# Patient Record
Sex: FEMALE | Race: BLACK OR AFRICAN AMERICAN | NOT HISPANIC OR LATINO | Employment: FULL TIME | URBAN - METROPOLITAN AREA
[De-identification: names, ages, dates, MRNs, and addresses within clinical notes are randomized per-mention and may not be internally consistent; named-entity substitution may affect disease eponyms.]

---

## 2020-03-17 ENCOUNTER — OFFICE VISIT (OUTPATIENT)
Dept: URGENT CARE | Facility: CLINIC | Age: 50
End: 2020-03-17
Payer: COMMERCIAL

## 2020-03-17 VITALS
TEMPERATURE: 98 F | DIASTOLIC BLOOD PRESSURE: 90 MMHG | HEART RATE: 97 BPM | SYSTOLIC BLOOD PRESSURE: 154 MMHG | WEIGHT: 198 LBS | BODY MASS INDEX: 33.8 KG/M2 | OXYGEN SATURATION: 99 % | HEIGHT: 64 IN

## 2020-03-17 DIAGNOSIS — J06.9 VIRAL URI WITH COUGH: Primary | ICD-10-CM

## 2020-03-17 PROCEDURE — 99203 OFFICE O/P NEW LOW 30 MIN: CPT | Mod: S$GLB,,, | Performed by: PHYSICIAN ASSISTANT

## 2020-03-17 PROCEDURE — 99203 PR OFFICE/OUTPT VISIT, NEW, LEVL III, 30-44 MIN: ICD-10-PCS | Mod: S$GLB,,, | Performed by: PHYSICIAN ASSISTANT

## 2020-03-17 RX ORDER — OSELTAMIVIR PHOSPHATE 75 MG/1
CAPSULE ORAL
COMMUNITY
Start: 2020-03-13 | End: 2022-06-06

## 2020-03-17 RX ORDER — PROMETHAZINE HYDROCHLORIDE AND DEXTROMETHORPHAN HYDROBROMIDE 6.25; 15 MG/5ML; MG/5ML
5 SYRUP ORAL NIGHTLY PRN
Qty: 118 ML | Refills: 0 | Status: SHIPPED | OUTPATIENT
Start: 2020-03-17 | End: 2020-03-27

## 2020-03-17 RX ORDER — ONDANSETRON 4 MG/1
4 TABLET, ORALLY DISINTEGRATING ORAL EVERY 8 HOURS PRN
Qty: 6 TABLET | Refills: 0 | Status: SHIPPED | OUTPATIENT
Start: 2020-03-17 | End: 2023-02-09

## 2020-03-17 RX ORDER — BENZONATATE 200 MG/1
200 CAPSULE ORAL 3 TIMES DAILY PRN
Qty: 30 CAPSULE | Refills: 0 | Status: SHIPPED | OUTPATIENT
Start: 2020-03-17 | End: 2020-03-27

## 2020-03-17 NOTE — PATIENT INSTRUCTIONS
At this time you do not meet requirement for testing for COVID 19.  You are considered low risk.  Monitor symptoms closely, schedule tele health visit he develops new or worsening symptoms or have increased concern.     - I recommend that you self quarantine until you are fever free and symptoms resolve.    - Rest.    - Drink plenty of fluids.      - Viral upper respiratory infections typically run their course in 10-14 days.     - Tylenol or Ibuprofen as directed as needed for fever/pain. Avoid tylenol if you have a history of liver disease. Do not take ibuprofen if you have a history of GI bleeding, kidney disease, or if you take blood thinners.     - You can take over-the-counter claritin, zyrtec, allegra, or xyzal as directed. These are antihistamines that can help with runny nose, nasal congestion, sneezing, and helps to dry up post-nasal drip, which usually causes sore throat and cough.   - If you do NOT have high blood pressure, you may use a decongestant form (D)  of this medication or if you do not take the D form, you can take sudafed  (pseudoephedrine) over the counter, which is a decongestant.    - You can use Flonase (fluticasone) nasal spray as directed for sinus congestion and postnasal drip. This is a steroid nasal spray that works locally over time to decrease the inflammation in your nose/sinuses and help with allergic symptoms. This is not an quick- relief spray like afrin, but it works well if used daily.  Discontinue if you develop nose bleed  - use nasal saline prior to Flonase.    - Use Ocean Spray Nasal Saline 1-3 puffs each nostril every 2-3 hours then blow out onto tissue. This is to irrigate the nasal passage way to clear the sinus openings. Use until sinus problem resolved.      - you can take plain Mucinex (guaifenesin) 1200 mg twice a day to help loosen mucous    -warm salt water gargles can help with sore throat    - warm tea with honey can help with cough. Honey is a natural cough  "suppressant.    - Take the tessalon (benzonatate) as needed as prescribed for cough   - Only take the promethazine- DM as directed, as needed at night for cough. This medication may cause drowsiness.     - Follow up with your PCP or specialty clinic as directed in the next 1-2 weeks if not improved or as needed.  You can call [phone] to schedule an appointment with the appropriate provider.      - Go to the ER if you develop new or worsening symptoms.     - You must understand that you have received an Urgent Care treatment only and that you may be released before all of your medical problems are known or treated.   - You, the patient, will arrange for follow up care as instructed.   - If your condition worsens or fails to improve we recommend that you receive another evaluation at the ER immediately or contact your PCP to discuss your concerns or return here.         Viral Syndrome (Adult)  A viral illness may cause a number of symptoms. The symptoms depend on the part of the body that the virus affects. If it settles in your nose, throat, and lungs, it may cause cough, sore throat, congestion, and sometimes headache. If it settles in your stomach and intestinal tract, it may cause vomiting and diarrhea. Sometimes it causes vague symptoms like "aching all over," feeling tired, loss of appetite, or fever.  A viral illness usually lasts 1 to 2 weeks, but sometimes it lasts longer. In some cases, a more serious infection can look like a viral syndrome in the first few days of the illness. You may need another exam and additional tests to know the difference. Watch for the warning signs listed below.  Home care  Follow these guidelines for taking care of yourself at home:  · If symptoms are severe, rest at home for the first 2 to 3 days.  · Stay away from cigarette smoke - both your smoke and the smoke from others.  · You may use over-the-counter acetaminophen or ibuprofen for fever, muscle aching, and headache, " unless another medicine was prescribed for this. If you have chronic liver or kidney disease or ever had a stomach ulcer or GI bleeding, talk with your doctor before using these medicines. No one who is younger than 18 and ill with a fever should take aspirin. It may cause severe disease or death.  · Your appetite may be poor, so a light diet is fine. Avoid dehydration by drinking 8 to 12 8-ounce glasses of fluids each day. This may include water; orange juice; lemonade; apple, grape, and cranberry juice; clear fruit drinks; electrolyte replacement and sports drinks; and decaffeinated teas and coffee. If you have been diagnosed with a kidney disease, ask your doctor how much and what types of fluids you should drink to prevent dehydration. If you have kidney disease, drinking too much fluid can cause it build up in the your body and be dangerous to your health.  · Over-the-counter remedies won't shorten the length of the illness but may be helpful for cough, sore throat; and nasal and sinus congestion. Don't use decongestants if you have high blood pressure.  Follow-up care  Follow up with your healthcare provider if you do not improve over the next week.  Call 911  Get emergency medical care if any of the following occur:  · Convulsion  · Feeling weak, dizzy, or like you are going to faint  · Chest pain, shortness of breath, wheezing, or difficulty breathing  When to seek medical advice  Call your healthcare provider right away if any of these occur:  · Cough with lots of colored sputum (mucus) or blood in your sputum  · Chest pain, shortness of breath, wheezing, or difficulty breathing  · Severe headache; face, neck, or ear pain  · Severe, constant pain in the lower right side of your belly (abdominal)  · Continued vomiting (cant keep liquids down)  · Frequent diarrhea (more than 5 times a day); blood (red or black color) or mucus in diarrhea  · Feeling weak, dizzy, or like you are going to faint  · Extreme  thirst  · Fever of 100.4°F (38°C) or higher, or as directed by your healthcare provider  Date Last Reviewed: 9/25/2015  © 0497-7570 [employer]. [address] [place] [address], [place] [address]. All rights reserved. This information is not intended as a substitute for professional medical care. Always follow your healthcare professional's instructions.

## 2020-03-17 NOTE — LETTER
March 17, 2020      [facility] [address] [address]  Phone: [phone]  Fax: [phone]       Patient: Mau Tovar   YOB: 1970  Date of Visit: 03/17/2020    To Whom It May Concern:    Maricruz Tovar  was at [facility] on 03/17/2020. She may return to work/school on  03/23/2020 (or sooner if symptoms improve and she goes 24 hours without fever without fever reducing medication)  with no restrictions. If you have any questions or concerns, or if I can be of further assistance, please do not hesitate to contact me.    Sincerely,    Anibal Machuca PA-C

## 2020-03-17 NOTE — PROGRESS NOTES
"Subjective:       Patient ID: Mau Tovar is a 49 y.o. female.    Vitals:  height is 5' 4" (1.626 m) and weight is 89.8 kg (198 lb). Her temperature is 97.8 °F (36.6 °C). Her blood pressure is 154/90 (abnormal) and her pulse is 97. Her oxygen saturation is 99%.     Chief Complaint: Cough    Fever, congestion, sore throat, and cough for 5 days. T max 103F. Pt seen by pcp 3/13/2020 was not tested for flu because there were no tests, but pcp suspected influenza, so she was given tamiflu.  Pt works at [employer]. No known exposure to covid. No shortness of breath. No medical conditions, not immunocompromised. No recent travel.    Cough   This is a new problem. The current episode started in the past 7 days. The problem has been waxing and waning. The problem occurs constantly. The cough is non-productive. Associated symptoms include a fever, myalgias, postnasal drip, a sore throat and sweats. Pertinent negatives include no chest pain, chills, ear pain, eye redness, headaches, hemoptysis, rash, shortness of breath or wheezing. Nothing aggravates the symptoms. Treatments tried: tamifly. The treatment provided no relief. There is no history of asthma, bronchitis, COPD or pneumonia.       Constitution: Positive for fever. Negative for chills, sweating, fatigue and unexpected weight change.   HENT: Positive for congestion, postnasal drip and sore throat. Negative for ear pain, sinus pain, sinus pressure and voice change.    Neck: Negative for neck pain, neck stiffness and painful lymph nodes.   Cardiovascular: Negative for chest pain, palpitations and sob on exertion.   Eyes: Negative for eye redness.   Respiratory: Positive for cough. Negative for chest tightness, sputum production, bloody sputum, COPD, shortness of breath, stridor, wheezing and asthma.    Gastrointestinal: Negative for nausea and vomiting.   Musculoskeletal: Positive for muscle ache. Negative for trauma.   Skin: Negative for rash.   Allergic/Immunologic: " Negative for seasonal allergies and asthma.   Neurological: Negative for dizziness, headaches and disorientation.   Hematologic/Lymphatic: Negative for swollen lymph nodes.   Psychiatric/Behavioral: Negative for disorientation.       Objective:      Physical Exam   Constitutional: She is oriented to person, place, and time. She appears well-developed and well-nourished. She is cooperative.  Non-toxic appearance. She does not have a sickly appearance. She does not appear ill. No distress.   - Due to state of emergency this exam was done [facility].   HENT:   Head: Normocephalic and atraumatic.   Right Ear: Hearing normal.   Left Ear: Hearing normal.   Nose: Nose normal. No mucosal edema, rhinorrhea or nasal deformity. No epistaxis. Right sinus exhibits no maxillary sinus tenderness and no frontal sinus tenderness. Left sinus exhibits no maxillary sinus tenderness and no frontal sinus tenderness.   Mouth/Throat: Uvula is midline, oropharynx is clear and moist and mucous membranes are normal. No trismus in the jaw. Normal dentition. No uvula swelling. No oropharyngeal exudate, posterior oropharyngeal edema or posterior oropharyngeal erythema. Tonsils are 1+ on the right. Tonsils are 1+ on the left. No tonsillar exudate.   Eyes: Conjunctivae and lids are normal. No scleral icterus.   Neck: Trachea normal, full passive range of motion without pain and phonation normal. Neck supple. No neck rigidity. No edema and no erythema present.   Cardiovascular: Normal rate, regular rhythm, normal heart sounds, intact distal pulses and normal pulses.   Pulmonary/Chest: Effort normal and breath sounds normal. No accessory muscle usage or stridor. No tachypnea and no bradypnea. No respiratory distress. She has no decreased breath sounds. She has no wheezes. She has no rhonchi. She has no rales.   No respiratory distress, no frequent cough. Lungs ctab.   Abdominal: Normal appearance.   Musculoskeletal: Normal range of  motion. She exhibits no edema or deformity.   Neurological: She is alert and oriented to person, place, and time. She exhibits normal muscle tone. Coordination normal.   Skin: Skin is warm, dry, intact, not diaphoretic and not pale.   Psychiatric: She has a normal mood and affect. Her speech is normal and behavior is normal. Judgment and thought content normal. Cognition and memory are normal.   Nursing note and vitals reviewed.        Instructed self quarantine. Patient does not meed criteria for covid testing at this time. Suspect viral syndrome, lungs clear and vitals reassuring. Instructed ER if she develops new or worsening symptoms     Assessment:       1. Viral URI with cough        Plan:         Viral URI with cough  -     benzonatate (TESSALON) 200 MG capsule; Take 1 capsule (200 mg total) by mouth 3 (three) times daily as needed for Cough.  Dispense: 30 capsule; Refill: 0  -     promethazine-dextromethorphan (PROMETHAZINE-DM) 6.25-15 mg/5 mL Syrp; Take 5 mLs by mouth nightly as needed.  Dispense: 118 mL; Refill: 0  -     ondansetron (ZOFRAN-ODT) 4 MG TbDL; Take 1 tablet (4 mg total) by mouth every 8 (eight) hours as needed (nausea).  Dispense: 6 tablet; Refill: 0      Patient Instructions   At this time you do not meet requirement for testing for COVID 19.  You are considered low risk.  Monitor symptoms closely, schedule tele health visit he develops new or worsening symptoms or have increased concern.     - I recommend that you self quarantine until you are fever free and symptoms resolve.    - Rest.    - Drink plenty of fluids.      - Viral upper respiratory infections typically run their course in 10-14 days.     - Tylenol or Ibuprofen as directed as needed for fever/pain. Avoid tylenol if you have a history of liver disease. Do not take ibuprofen if you have a history of GI bleeding, kidney disease, or if you take blood thinners.     - You can take over-the-counter claritin, zyrtec, allegra, or xyzal  as directed. These are antihistamines that can help with runny nose, nasal congestion, sneezing, and helps to dry up post-nasal drip, which usually causes sore throat and cough.   - If you do NOT have high blood pressure, you may use a decongestant form (D)  of this medication or if you do not take the D form, you can take sudafed  (pseudoephedrine) over the counter, which is a decongestant.    - You can use Flonase (fluticasone) nasal spray as directed for sinus congestion and postnasal drip. This is a steroid nasal spray that works locally over time to decrease the inflammation in your nose/sinuses and help with allergic symptoms. This is not an quick- relief spray like afrin, but it works well if used daily.  Discontinue if you develop nose bleed  - use nasal saline prior to Flonase.    - Use Ocean Spray Nasal Saline 1-3 puffs each nostril every 2-3 hours then blow out onto tissue. This is to irrigate the nasal passage way to clear the sinus openings. Use until sinus problem resolved.      - you can take plain Mucinex (guaifenesin) 1200 mg twice a day to help loosen mucous    -warm salt water gargles can help with sore throat    - warm tea with honey can help with cough. Honey is a natural cough suppressant.    - Take the tessalon (benzonatate) as needed as prescribed for cough   - Only take the promethazine- DM as directed, as needed at night for cough. This medication may cause drowsiness.     - Follow up with your PCP or specialty clinic as directed in the next 1-2 weeks if not improved or as needed.  You can call [phone] to schedule an appointment with the appropriate provider.      - Go to the ER if you develop new or worsening symptoms.     - You must understand that you have received an Urgent Care treatment only and that you may be released before all of your medical problems are known or treated.   - You, the patient, will arrange for follow up care as instructed.   - If your condition worsens or  "fails to improve we recommend that you receive another evaluation at the ER immediately or contact your PCP to discuss your concerns or return here.         Viral Syndrome (Adult)  A viral illness may cause a number of symptoms. The symptoms depend on the part of the body that the virus affects. If it settles in your nose, throat, and lungs, it may cause cough, sore throat, congestion, and sometimes headache. If it settles in your stomach and intestinal tract, it may cause vomiting and diarrhea. Sometimes it causes vague symptoms like "aching all over," feeling tired, loss of appetite, or fever.  A viral illness usually lasts 1 to 2 weeks, but sometimes it lasts longer. In some cases, a more serious infection can look like a viral syndrome in the first few days of the illness. You may need another exam and additional tests to know the difference. Watch for the warning signs listed below.  Home care  Follow these guidelines for taking care of yourself at home:  · If symptoms are severe, rest at home for the first 2 to 3 days.  · Stay away from cigarette smoke - both your smoke and the smoke from others.  · You may use over-the-counter acetaminophen or ibuprofen for fever, muscle aching, and headache, unless another medicine was prescribed for this. If you have chronic liver or kidney disease or ever had a stomach ulcer or GI bleeding, talk with your doctor before using these medicines. No one who is younger than 18 and ill with a fever should take aspirin. It may cause severe disease or death.  · Your appetite may be poor, so a light diet is fine. Avoid dehydration by drinking 8 to 12 8-ounce glasses of fluids each day. This may include water; orange juice; lemonade; apple, grape, and cranberry juice; clear fruit drinks; electrolyte replacement and sports drinks; and decaffeinated teas and coffee. If you have been diagnosed with a kidney disease, ask your doctor how much and what types of fluids you should drink to " prevent dehydration. If you have kidney disease, drinking too much fluid can cause it build up in the your body and be dangerous to your health.  · Over-the-counter remedies won't shorten the length of the illness but may be helpful for cough, sore throat; and nasal and sinus congestion. Don't use decongestants if you have high blood pressure.  Follow-up care  Follow up with your healthcare provider if you do not improve over the next week.  Call 911  Get emergency medical care if any of the following occur:  · Convulsion  · Feeling weak, dizzy, or like you are going to faint  · Chest pain, shortness of breath, wheezing, or difficulty breathing  When to seek medical advice  Call your healthcare provider right away if any of these occur:  · Cough with lots of colored sputum (mucus) or blood in your sputum  · Chest pain, shortness of breath, wheezing, or difficulty breathing  · Severe headache; face, neck, or ear pain  · Severe, constant pain in the lower right side of your belly (abdominal)  · Continued vomiting (cant keep liquids down)  · Frequent diarrhea (more than 5 times a day); blood (red or black color) or mucus in diarrhea  · Feeling weak, dizzy, or like you are going to faint  · Extreme thirst  · Fever of 100.4°F (38°C) or higher, or as directed by your healthcare provider  Date Last Reviewed: 9/25/2015  © 6496-4175 [employer]. [address] [place] [address]. All rights reserved. This information is not intended as a substitute for professional medical care. Always follow your healthcare professional's instructions.

## 2022-02-03 ENCOUNTER — OFFICE VISIT (OUTPATIENT)
Dept: URGENT CARE | Facility: CLINIC | Age: 52
End: 2022-02-03
Payer: COMMERCIAL

## 2022-02-03 VITALS
OXYGEN SATURATION: 99 % | TEMPERATURE: 98 F | WEIGHT: 198 LBS | HEART RATE: 85 BPM | DIASTOLIC BLOOD PRESSURE: 86 MMHG | BODY MASS INDEX: 33.8 KG/M2 | SYSTOLIC BLOOD PRESSURE: 170 MMHG | RESPIRATION RATE: 18 BRPM | HEIGHT: 64 IN

## 2022-02-03 DIAGNOSIS — M54.2 NECK PAIN: Primary | ICD-10-CM

## 2022-02-03 DIAGNOSIS — G44.219 EPISODIC TENSION-TYPE HEADACHE, NOT INTRACTABLE: ICD-10-CM

## 2022-02-03 DIAGNOSIS — R03.0 ELEVATED BLOOD PRESSURE READING: ICD-10-CM

## 2022-02-03 PROCEDURE — 1159F PR MEDICATION LIST DOCUMENTED IN MEDICAL RECORD: ICD-10-PCS | Mod: CPTII,S$GLB,, | Performed by: NURSE PRACTITIONER

## 2022-02-03 PROCEDURE — 3077F SYST BP >= 140 MM HG: CPT | Mod: CPTII,S$GLB,, | Performed by: NURSE PRACTITIONER

## 2022-02-03 PROCEDURE — 3008F PR BODY MASS INDEX (BMI) DOCUMENTED: ICD-10-PCS | Mod: CPTII,S$GLB,, | Performed by: NURSE PRACTITIONER

## 2022-02-03 PROCEDURE — 3077F PR MOST RECENT SYSTOLIC BLOOD PRESSURE >= 140 MM HG: ICD-10-PCS | Mod: CPTII,S$GLB,, | Performed by: NURSE PRACTITIONER

## 2022-02-03 PROCEDURE — 96372 THER/PROPH/DIAG INJ SC/IM: CPT | Mod: S$GLB,,, | Performed by: NURSE PRACTITIONER

## 2022-02-03 PROCEDURE — 1159F MED LIST DOCD IN RCRD: CPT | Mod: CPTII,S$GLB,, | Performed by: NURSE PRACTITIONER

## 2022-02-03 PROCEDURE — 96372 PR INJECTION,THERAP/PROPH/DIAG2ST, IM OR SUBCUT: ICD-10-PCS | Mod: S$GLB,,, | Performed by: NURSE PRACTITIONER

## 2022-02-03 PROCEDURE — 99213 PR OFFICE/OUTPT VISIT, EST, LEVL III, 20-29 MIN: ICD-10-PCS | Mod: 25,S$GLB,, | Performed by: NURSE PRACTITIONER

## 2022-02-03 PROCEDURE — 1160F RVW MEDS BY RX/DR IN RCRD: CPT | Mod: CPTII,S$GLB,, | Performed by: NURSE PRACTITIONER

## 2022-02-03 PROCEDURE — 3079F PR MOST RECENT DIASTOLIC BLOOD PRESSURE 80-89 MM HG: ICD-10-PCS | Mod: CPTII,S$GLB,, | Performed by: NURSE PRACTITIONER

## 2022-02-03 PROCEDURE — 1160F PR REVIEW ALL MEDS BY PRESCRIBER/CLIN PHARMACIST DOCUMENTED: ICD-10-PCS | Mod: CPTII,S$GLB,, | Performed by: NURSE PRACTITIONER

## 2022-02-03 PROCEDURE — 3008F BODY MASS INDEX DOCD: CPT | Mod: CPTII,S$GLB,, | Performed by: NURSE PRACTITIONER

## 2022-02-03 PROCEDURE — 99213 OFFICE O/P EST LOW 20 MIN: CPT | Mod: 25,S$GLB,, | Performed by: NURSE PRACTITIONER

## 2022-02-03 PROCEDURE — 3079F DIAST BP 80-89 MM HG: CPT | Mod: CPTII,S$GLB,, | Performed by: NURSE PRACTITIONER

## 2022-02-03 RX ORDER — KETOROLAC TROMETHAMINE 30 MG/ML
30 INJECTION, SOLUTION INTRAMUSCULAR; INTRAVENOUS
Status: COMPLETED | OUTPATIENT
Start: 2022-02-03 | End: 2022-02-03

## 2022-02-03 RX ORDER — NAPROXEN 500 MG/1
500 TABLET ORAL 2 TIMES DAILY WITH MEALS
Qty: 30 TABLET | Refills: 0 | OUTPATIENT
Start: 2022-02-03 | End: 2022-02-05

## 2022-02-03 RX ORDER — METHOCARBAMOL 500 MG/1
500 TABLET, FILM COATED ORAL 3 TIMES DAILY
Qty: 30 TABLET | Refills: 0 | Status: SHIPPED | OUTPATIENT
Start: 2022-02-03 | End: 2022-02-05 | Stop reason: SDUPTHER

## 2022-02-03 RX ADMIN — KETOROLAC TROMETHAMINE 30 MG: 30 INJECTION, SOLUTION INTRAMUSCULAR; INTRAVENOUS at 01:02

## 2022-02-03 NOTE — PATIENT INSTRUCTIONS
Elevated Blood Pressure in Office Setting    - Your blood pressure was elevated during your visit to the urgent care. It was not so high that immediate care was needed but it is recommended that you monitor your blood pressure over the next week or two to make sure that it is not staying elevated.    - Please have your blood pressure taken 2-3 times daily at different times of the day.  - Write all of those blood pressures down and record the time that they were taken.   - Keep all that information and take it with you to see your Primary Care Physician.   - According to ACEP guidelines, workup and treatment of hypertension in asymptomatic patient is not warranted:  (1) Initiating treatment for asymptomatic hypertension in the ED is not necessary when patients have follow-up.  (2) Rapidly lowering blood pressure in asymptomatic patients in the ED is unnecessary and may be harmful in some patients  (3) When ED treatment for asymptomatic hypertension is initiated, blood pressure management should attempt to gradually lower blood pressure and should not be expected to be normalized during the initial ED visit.       Patient Education       Generalized Neck Pain Discharge Instructions   About this topic   Our neck has many parts including bones, muscles, tendons, ligaments, and nerves. Vertebrae, the bones in your spine, start at the base of your skull and extend down the back of your neck. There are discs between the vertebrae to cushion the bones. Ligaments, muscles, and tendons help hold your spine in place and let you move your neck. Your spinal cord starts at the base of your brain and extends down your back. It is protected by your vertebrae. Smaller nerves travel from your spinal cord to your muscles and skin.  Most neck pain is caused by an injury to a ligament, tendon, muscle, or nerve.       What care is needed at home?   · Ask your doctor what you need to do when you go home. Make sure you ask questions if  you do not understand what the doctor says.  · Wear your neck brace or cushion as you were told to. If the doctor told you to, you may start doing gentle neck stretches in a few days.  · For recent strains, place an ice pack or a bag of frozen vegetables wrapped in a towel over the painful part. Never put ice right on the skin. Use ice every 1 to 2 hours for 10 to 15 minutes at a time. Use for the first 24 to 48 hours after your injury.  · Use heat after the first 24 to 48 hours, but not right away. Put a heating pad on the painful part for no more than 20 minutes at a time. Never go to sleep with a heating pad on as this can cause burns. You can also take a hot shower or bath.  · You may want to take medicines like ibuprofen or naproxen for swelling and pain. These are nonsteroidal anti-inflammatory drugs (NSAIDs).  · Try to practice good posture to avoid putting strain on your neck. Sit up straight and keep your shoulders back. It can also help to avoid sitting in the same position for too long and to avoid putting pressure on your upper back by carrying heavy things. When you sleep, try to keep your neck in line with the rest of your body.  What follow-up care is needed?   Your doctor may ask you to make visits to the office to check on your progress. Be sure to keep these visits.  What drugs may be needed?   The doctor may order drugs to:  · Help with pain and swelling  · Relax muscles  · Fight an infection  The doctor may give you a shot of an anti-inflammatory drug called a corticosteroid. This will help with swelling. Talk with your doctor about the risks of this shot.  Will physical activity be limited?   You may need to rest for a while. You should not do physical activity that makes your health problem worse. Talk to your doctor if you run, work out, or play sports. You may not be able to do those things until your health problem gets better.  What problems could happen?   · Infection  · Bleeding  · Injury  to nerves, tendons, or blood vessels  · Ongoing pain  · Blood clots  · Numbness, tingling, or weakness in the arms or legs  · Arthritis  · Loss of bladder or bowel control  · Paralysis  What can be done to prevent this health problem?   · Always wear a seat belt. Drive safely. Obey speed limits. Do not drink and drive.  · Have headrests in the car at the right height. The middle of the headrest should be even with the upper parts of your ears.  · Take breaks often when doing things that use repeat movements.  · If you have a desk job, make sure your computer is at eye level and that you have a supportive chair. Read papers at eye level.  · If you use the telephone often for your job, use a headset if possible. Do not hold the phone between your ear and shoulder.  · Stay active and work out to keep your muscles strong and flexible.  · Warm up slowly and stretch before you work out. Use good ways to train, such as slowly adding to how far you run. Do not work out if you are overly tired. Take extra care if working out in cold weather.  · Wear the right equipment when playing sports.  · Always wear helmets for bikes and motorcycles.  When do I need to call the doctor?   · Your neck becomes stiff and hard to move and you are feeling sick or develop a fever or chills.  · You have new weakness in one of your arms.  · You have bad pain that is not helped by pain medicine.  · Your hand or arm is swollen.  · Your arm is numb or tingly.  Teach Back: Helping You Understand   The Teach Back Method helps you understand the information we are giving you. After you talk with the staff, tell them in your own words what you learned. This helps to make sure the staff has described each thing clearly. It also helps to explain things that may have been confusing. Before going home, make sure you can do these:  · I can tell you about my pain.  · I can tell you what may help ease my pain.  · I can tell you what I will do if I have fever,  chills, nausea, vomiting, light sensitivity, or a very bad headache and neck stiffness.  Where can I learn more?   [employer] [facility]  https://www.betterhealth.[place].gov.[place]/health/ConditionsAndTreatments/neck-pain   [facility] Choices  https://www.nhs.[place]/conditions/neck-pain-and-stiff-neck/   Last Reviewed Date   2021-09-01  Consumer Information Use and Disclaimer   This information is not specific medical advice and does not replace information you receive from your health care provider. This is only a brief summary of general information. It does NOT include all information about conditions, illnesses, injuries, tests, procedures, treatments, therapies, discharge instructions or life-style choices that may apply to you. You must talk with your health care provider for complete information about your health and treatment options. This information should not be used to decide whether or not to accept your health care providers advice, instructions or recommendations. Only your health care provider has the knowledge and training to provide advice that is right for you.  Copyright   Copyright © 2021 [employer] and its affiliates and/or licensors. All rights reserved.

## 2022-02-03 NOTE — PROGRESS NOTES
"Subjective:       Patient ID: Mau Tovar is a 51 y.o. female.    Vitals:  height is 5' 4" (1.626 m) and weight is 89.8 kg (198 lb). Her temperature is 98.2 °F (36.8 °C). Her blood pressure is 170/86 (abnormal) and her pulse is 85. Her respiration is 18 and oxygen saturation is 99%.     Chief Complaint: Neck Pain    Neck pain started 2 days ago, upon wakening, and gradually worsening. Pain 10/10 at this time. Denies injury. Appetite ok. No N/V/D. Also having tension pain to occipital area, and right ear pain. No fever. No illness. Pt tearful d/t pain. Good activity level with non-toxic appearance.     Neck Pain   This is a new problem. The current episode started in the past 7 days (x2 days). The problem occurs rarely. The problem has been gradually worsening. The pain is associated with an unknown factor. The pain is present in the midline. The quality of the pain is described as stabbing. The pain is at a severity of 10/10. The pain is severe. The symptoms are aggravated by position and bending. The pain is same all the time. Stiffness is present all day. Pertinent negatives include no fever, numbness or tingling. She has tried acetaminophen and heat for the symptoms. The treatment provided no relief.       Constitution: Negative for fatigue, fever and generalized weakness.   HENT: Positive for ear pain (left).    Neck: Positive for neck pain and neck swelling.   Neurological: Negative for numbness.       Objective:      Physical Exam   Constitutional: She is oriented to person, place, and time.   HENT:   Head: Normocephalic.   Ears:   Right Ear: Hearing and tympanic membrane normal. No mastoid tenderness.   Left Ear: Hearing and tympanic membrane normal. No mastoid tenderness.   Nose: Nose normal.   Mouth/Throat: Mucous membranes are normal. Mucous membranes are moist.   Eyes: Conjunctivae are normal.   Neck: No neck rigidity present. decreased range of motion present. pain with movement present. muscular " tenderness present.   Cardiovascular: Normal rate, regular rhythm, normal heart sounds and normal pulses.   Pulmonary/Chest: Effort normal.   Abdominal: Normal appearance.   Musculoskeletal:         General: Tenderness present.      Cervical back: She exhibits tenderness.   Lymphadenopathy:     She has no cervical adenopathy.   Neurological: no focal deficit. She is alert and oriented to person, place, and time.   Skin: Skin is warm and dry.   Psychiatric: Her behavior is normal. Mood normal.         Assessment:       1. Elevated blood pressure reading    2. Neck pain    3. Episodic tension-type headache, not intractable          Plan:         Elevated blood pressure reading    Neck pain  -     ketorolac injection 30 mg  -     methocarbamoL (ROBAXIN) 500 MG Tab; Take 1 tablet (500 mg total) by mouth 3 (three) times daily. for 10 days  Dispense: 30 tablet; Refill: 0  -     naproxen (NAPROSYN) 500 MG tablet; Take 1 tablet (500 mg total) by mouth 2 (two) times daily with meals. for 15 days  Dispense: 30 tablet; Refill: 0    Episodic tension-type headache, not intractable  -     naproxen (NAPROSYN) 500 MG tablet; Take 1 tablet (500 mg total) by mouth 2 (two) times daily with meals. for 15 days  Dispense: 30 tablet; Refill: 0      Patient Instructions   Elevated Blood Pressure in Office Setting    - Your blood pressure was elevated during your visit to the urgent care. It was not so high that immediate care was needed but it is recommended that you monitor your blood pressure over the next week or two to make sure that it is not staying elevated.    - Please have your blood pressure taken 2-3 times daily at different times of the day.  - Write all of those blood pressures down and record the time that they were taken.   - Keep all that information and take it with you to see your Primary Care Physician.   - According to ACEP guidelines, workup and treatment of hypertension in asymptomatic patient is not warranted:  (1)  Initiating treatment for asymptomatic hypertension in the ED is not necessary when patients have follow-up.  (2) Rapidly lowering blood pressure in asymptomatic patients in the ED is unnecessary and may be harmful in some patients  (3) When ED treatment for asymptomatic hypertension is initiated, blood pressure management should attempt to gradually lower blood pressure and should not be expected to be normalized during the initial ED visit.       Patient Education       Generalized Neck Pain Discharge Instructions   About this topic   Our neck has many parts including bones, muscles, tendons, ligaments, and nerves. Vertebrae, the bones in your spine, start at the base of your skull and extend down the back of your neck. There are discs between the vertebrae to cushion the bones. Ligaments, muscles, and tendons help hold your spine in place and let you move your neck. Your spinal cord starts at the base of your brain and extends down your back. It is protected by your vertebrae. Smaller nerves travel from your spinal cord to your muscles and skin.  Most neck pain is caused by an injury to a ligament, tendon, muscle, or nerve.       What care is needed at home?   · Ask your doctor what you need to do when you go home. Make sure you ask questions if you do not understand what the doctor says.  · Wear your neck brace or cushion as you were told to. If the doctor told you to, you may start doing gentle neck stretches in a few days.  · For recent strains, place an ice pack or a bag of frozen vegetables wrapped in a towel over the painful part. Never put ice right on the skin. Use ice every 1 to 2 hours for 10 to 15 minutes at a time. Use for the first 24 to 48 hours after your injury.  · Use heat after the first 24 to 48 hours, but not right away. Put a heating pad on the painful part for no more than 20 minutes at a time. Never go to sleep with a heating pad on as this can cause burns. You can also take a hot shower or  bath.  · You may want to take medicines like ibuprofen or naproxen for swelling and pain. These are nonsteroidal anti-inflammatory drugs (NSAIDs).  · Try to practice good posture to avoid putting strain on your neck. Sit up straight and keep your shoulders back. It can also help to avoid sitting in the same position for too long and to avoid putting pressure on your upper back by carrying heavy things. When you sleep, try to keep your neck in line with the rest of your body.  What follow-up care is needed?   Your doctor may ask you to make visits to the office to check on your progress. Be sure to keep these visits.  What drugs may be needed?   The doctor may order drugs to:  · Help with pain and swelling  · Relax muscles  · Fight an infection  The doctor may give you a shot of an anti-inflammatory drug called a corticosteroid. This will help with swelling. Talk with your doctor about the risks of this shot.  Will physical activity be limited?   You may need to rest for a while. You should not do physical activity that makes your health problem worse. Talk to your doctor if you run, work out, or play sports. You may not be able to do those things until your health problem gets better.  What problems could happen?   · Infection  · Bleeding  · Injury to nerves, tendons, or blood vessels  · Ongoing pain  · Blood clots  · Numbness, tingling, or weakness in the arms or legs  · Arthritis  · Loss of bladder or bowel control  · Paralysis  What can be done to prevent this health problem?   · Always wear a seat belt. Drive safely. Obey speed limits. Do not drink and drive.  · Have headrests in the car at the right height. The middle of the headrest should be even with the upper parts of your ears.  · Take breaks often when doing things that use repeat movements.  · If you have a desk job, make sure your computer is at eye level and that you have a supportive chair. Read papers at eye level.  · If you use the telephone often  for your job, use a headset if possible. Do not hold the phone between your ear and shoulder.  · Stay active and work out to keep your muscles strong and flexible.  · Warm up slowly and stretch before you work out. Use good ways to train, such as slowly adding to how far you run. Do not work out if you are overly tired. Take extra care if working out in cold weather.  · Wear the right equipment when playing sports.  · Always wear helmets for bikes and motorcycles.  When do I need to call the doctor?   · Your neck becomes stiff and hard to move and you are feeling sick or develop a fever or chills.  · You have new weakness in one of your arms.  · You have bad pain that is not helped by pain medicine.  · Your hand or arm is swollen.  · Your arm is numb or tingly.  Teach Back: Helping You Understand   The Teach Back Method helps you understand the information we are giving you. After you talk with the staff, tell them in your own words what you learned. This helps to make sure the staff has described each thing clearly. It also helps to explain things that may have been confusing. Before going home, make sure you can do these:  · I can tell you about my pain.  · I can tell you what may help ease my pain.  · I can tell you what I will do if I have fever, chills, nausea, vomiting, light sensitivity, or a very bad headache and neck stiffness.  Where can I learn more?   [employer]  https://www.betterhealth.[place].gov.au/health/ConditionsAndTreatments/neck-pain   [facility] Choices  https://www.nhs.[place]/conditions/neck-pain-and-stiff-neck/   Last Reviewed Date   2021-09-01  Consumer Information Use and Disclaimer   This information is not specific medical advice and does not replace information you receive from your health care provider. This is only a brief summary of general information. It does NOT include all information about conditions, illnesses, injuries, tests, procedures, treatments, therapies, discharge  instructions or life-style choices that may apply to you. You must talk with your health care provider for complete information about your health and treatment options. This information should not be used to decide whether or not to accept your health care providers advice, instructions or recommendations. Only your health care provider has the knowledge and training to provide advice that is right for you.  Copyright   Copyright © 2021 [employer] and its affiliates and/or licensors. All rights reserved.

## 2022-02-04 ENCOUNTER — HOSPITAL ENCOUNTER (EMERGENCY)
Facility: HOSPITAL | Age: 52
Discharge: HOME | End: 2022-02-05
Attending: EMERGENCY MEDICINE
Payer: COMMERCIAL

## 2022-02-04 VITALS
WEIGHT: 198 LBS | TEMPERATURE: 99 F | OXYGEN SATURATION: 95 % | SYSTOLIC BLOOD PRESSURE: 139 MMHG | BODY MASS INDEX: 33.99 KG/M2 | RESPIRATION RATE: 18 BRPM | DIASTOLIC BLOOD PRESSURE: 76 MMHG | HEART RATE: 87 BPM

## 2022-02-04 DIAGNOSIS — M54.2 NECK PAIN: ICD-10-CM

## 2022-02-04 DIAGNOSIS — M65.20 CALCIFIC TENDONITIS: Primary | ICD-10-CM

## 2022-02-04 LAB
BUN SERPL-MCNC: 20 MG/DL (ref 6–30)
CHLORIDE SERPL-SCNC: 104 MMOL/L (ref 95–110)
CK SERPL-CCNC: 269 U/L (ref 20–180)
CREAT SERPL-MCNC: 0.9 MG/DL (ref 0.5–1.4)
CRP SERPL-MCNC: 50.2 MG/L (ref 0–8.2)
GLUCOSE SERPL-MCNC: 105 MG/DL (ref 70–110)
HCT VFR BLD CALC: 38 %PCV (ref 36–54)
POC IONIZED CALCIUM: 1.22 MMOL/L (ref 1.06–1.42)
POC TCO2 (MEASURED): 27 MMOL/L (ref 23–29)
POTASSIUM BLD-SCNC: 3.7 MMOL/L (ref 3.5–5.1)
SAMPLE: NORMAL
SODIUM BLD-SCNC: 140 MMOL/L (ref 136–145)

## 2022-02-04 PROCEDURE — 25000003 PHARM REV CODE 250: Performed by: EMERGENCY MEDICINE

## 2022-02-04 PROCEDURE — 86140 C-REACTIVE PROTEIN: CPT | Performed by: EMERGENCY MEDICINE

## 2022-02-04 PROCEDURE — 80047 BASIC METABLC PNL IONIZED CA: CPT

## 2022-02-04 PROCEDURE — 25500020 PHARM REV CODE 255: Performed by: EMERGENCY MEDICINE

## 2022-02-04 PROCEDURE — 99285 EMERGENCY DEPT VISIT HI MDM: CPT | Mod: 25

## 2022-02-04 PROCEDURE — 82550 ASSAY OF CK (CPK): CPT | Performed by: EMERGENCY MEDICINE

## 2022-02-04 PROCEDURE — 99284 EMERGENCY DEPT VISIT MOD MDM: CPT | Mod: ,,, | Performed by: EMERGENCY MEDICINE

## 2022-02-04 PROCEDURE — 99284 PR EMERGENCY DEPT VISIT,LEVEL IV: ICD-10-PCS | Mod: ,,, | Performed by: EMERGENCY MEDICINE

## 2022-02-04 RX ORDER — LIDOCAINE HYDROCHLORIDE 20 MG/ML
5 INJECTION, SOLUTION INFILTRATION; PERINEURAL
Status: DISCONTINUED | OUTPATIENT
Start: 2022-02-04 | End: 2022-02-05 | Stop reason: HOSPADM

## 2022-02-04 RX ORDER — DIAZEPAM 5 MG/1
5 TABLET ORAL
Status: COMPLETED | OUTPATIENT
Start: 2022-02-04 | End: 2022-02-04

## 2022-02-04 RX ADMIN — IOHEXOL 75 ML: 350 INJECTION, SOLUTION INTRAVENOUS at 10:02

## 2022-02-04 RX ADMIN — DIAZEPAM 5 MG: 5 TABLET ORAL at 09:02

## 2022-02-05 LAB
BASOPHILS # BLD AUTO: 0.03 K/UL (ref 0–0.2)
BASOPHILS NFR BLD: 0.3 % (ref 0–1.9)
DIFFERENTIAL METHOD: ABNORMAL
EOSINOPHIL # BLD AUTO: 0.1 K/UL (ref 0–0.5)
EOSINOPHIL NFR BLD: 0.9 % (ref 0–8)
ERYTHROCYTE [DISTWIDTH] IN BLOOD BY AUTOMATED COUNT: 14.6 % (ref 11.5–14.5)
HCT VFR BLD AUTO: 43.1 % (ref 37–48.5)
HGB BLD-MCNC: 13.1 G/DL (ref 12–16)
IMM GRANULOCYTES # BLD AUTO: 0.01 K/UL (ref 0–0.04)
IMM GRANULOCYTES NFR BLD AUTO: 0.1 % (ref 0–0.5)
LYMPHOCYTES # BLD AUTO: 2.4 K/UL (ref 1–4.8)
LYMPHOCYTES NFR BLD: 23.9 % (ref 18–48)
MCH RBC QN AUTO: 26.7 PG (ref 27–31)
MCHC RBC AUTO-ENTMCNC: 30.4 G/DL (ref 32–36)
MCV RBC AUTO: 88 FL (ref 82–98)
MONOCYTES # BLD AUTO: 0.8 K/UL (ref 0.3–1)
MONOCYTES NFR BLD: 7.9 % (ref 4–15)
NEUTROPHILS # BLD AUTO: 6.7 K/UL (ref 1.8–7.7)
NEUTROPHILS NFR BLD: 66.9 % (ref 38–73)
NRBC BLD-RTO: 0 /100 WBC
PLATELET # BLD AUTO: 211 K/UL (ref 150–450)
PMV BLD AUTO: 9.9 FL (ref 9.2–12.9)
RBC # BLD AUTO: 4.9 M/UL (ref 4–5.4)
WBC # BLD AUTO: 10.02 K/UL (ref 3.9–12.7)

## 2022-02-05 PROCEDURE — 85025 COMPLETE CBC W/AUTO DIFF WBC: CPT | Performed by: EMERGENCY MEDICINE

## 2022-02-05 PROCEDURE — 25000003 PHARM REV CODE 250: Performed by: EMERGENCY MEDICINE

## 2022-02-05 RX ORDER — IBUPROFEN 600 MG/1
600 TABLET ORAL EVERY 6 HOURS PRN
Qty: 20 TABLET | Refills: 0 | Status: SHIPPED | OUTPATIENT
Start: 2022-02-05

## 2022-02-05 RX ORDER — IBUPROFEN 600 MG/1
600 TABLET ORAL
Status: COMPLETED | OUTPATIENT
Start: 2022-02-05 | End: 2022-02-05

## 2022-02-05 RX ORDER — METHOCARBAMOL 500 MG/1
1000 TABLET, FILM COATED ORAL EVERY 8 HOURS PRN
Qty: 30 TABLET | Refills: 0 | Status: SHIPPED | OUTPATIENT
Start: 2022-02-05 | End: 2022-02-10

## 2022-02-05 RX ADMIN — IBUPROFEN 600 MG: 600 TABLET, FILM COATED ORAL at 12:02

## 2022-02-05 NOTE — PROVIDER PROGRESS NOTES - EMERGENCY DEPT.
Encounter Date: 2/4/2022    ED Physician Progress Notes        Physician Note:   Received pt at signout  MRI reveals:  - Abnormal vertebral body signal at C5-C7 most suggestive of Modic degenerative changes.  No epidural fluid collection or other noncontrast findings to specifically suggest spondylodiscitis.  - Limited study secondary to significant metallic artifact from patient's braces.  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  - Multilevel cervical spondylosis most significant at C5-6 and C6-7 with mild-to-moderate spinal canal stenosis and multilevel neural foraminal narrowing as above.  - Incompletely visualized retropharyngeal/prevertebral effusion, fully evaluated on recent CT.    Patient seen and examined by me.  She is resting comfortably on room air.  I explained the MRI findings to her.  Presentation is overall most consistent with calcific tendinitis.  There is no evidence of an abscess amenable to drainage.  No fever or leukocytosis to suggest infectious etiology.  I am uncertain where that CRP elevation is from but it is nonspecific.  Patient provided with instructions for symptomatic treatment.  Her Robaxin dose was adjusted and she was instructed to stop naproxen take ibuprofen.  Provided with instructions for symptomatic treatment and extensive return precautions.  PCP follow-up.  She is comfortable with plan.

## 2022-02-05 NOTE — DISCHARGE INSTRUCTIONS
Use warm compresses as needed for spasm.  You may also use Robaxin as needed for spasm.  It will make you drowsy as it is a muscle relaxant.    Stop using naproxen.  You may take ibuprofen 600 mg every 6 hours as needed for pain.    Follow-up with primary care physician.    Return to the emergency department for any worsening symptoms.

## 2022-02-05 NOTE — ED NOTES
I-STAT Chem-8+ Results:   Value Reference Range   Sodium 140 136-145 mmol/L   Potassium  3.7 3.5-5.1 mmol/L   Chloride 104  mmol/L   Ionized Calcium 1.22 1.06-1.42 mmol/L   CO2 (measured) 27 23-29 mmol/L   Glucose 105  mg/dL   BUN 20 6-30 mg/dL   Creatinine 0.9 0.5-1.4 mg/dL   Hematocrit 38 36-54%

## 2022-02-05 NOTE — ED PROVIDER NOTES
Encounter Date: 2/4/2022    SCRIBE #1 NOTE: I, Marla Luque, am scribing for, and in the presence of,  Stan Rich MD. I have scribed the following portions of the note - Other sections scribed: HPI, ROS.       History     Chief Complaint   Patient presents with    Neck Pain     and headache x 2 days. Seen yesterday at  and and given naproxen 500 mg and methocarbam 500 mg, taking meds without relief     Time patient was seen by the provider: 8:29 PM      The patient is a 51 y.o. female with PMHx including hysterectomy who presents to the ED with a complaint of right sided neck pain and swelling with onset 2 days ago. She also began experiencing a headache yesterday. The pain moves from her head down to her neck and she describes it as excruciating. She reports that is is worsening. She also says that her balance feels off and she has chills. She went to urgent care yesterday and they gave her naproxen and a toradol shot. She reports that neither of these improved her symptoms. She exclaims that she can not turn her head and she can only sleep on her right side due to the pain. Denies numbness, weakness, dizziness, nausea, vomiting, fever, diaphoresis, SOB, cough, trauma, and sore throat. Denies use of alcohol and drugs.    The history is provided by the patient, a relative and medical records. No  was used.     Review of patient's allergies indicates:  No Known Allergies  No past medical history on file.  Past Surgical History:   Procedure Laterality Date    HYSTERECTOMY       Family History   Problem Relation Age of Onset    No Known Problems Mother     No Known Problems Father      Social History     Tobacco Use    Smoking status: Never Smoker    Smokeless tobacco: Never Used   Substance Use Topics    Alcohol use: Not Currently     Review of Systems   Constitutional: Positive for chills. Negative for diaphoresis and fever.   HENT:        - mouth pain   Respiratory: Negative  for cough and shortness of breath.    Gastrointestinal: Negative for abdominal pain, nausea and vomiting.   Musculoskeletal: Positive for neck pain (right side).        +swelling to the back/right side of neck   Neurological: Positive for headaches. Negative for dizziness, weakness and numbness.        + equilibrium off       Physical Exam     Initial Vitals [02/04/22 1904]   BP Pulse Resp Temp SpO2   139/76 87 18 99.2 °F (37.3 °C) 95 %      MAP       --         Physical Exam  General: Awake and alert, well-nourished  HENT: moist mucous membranes, uvula midline, normal pharynx.  No trismus  Neck: Muscle spasm in R anterior and posterior neck muscles which are tender to palpation.  No c-spine tenderness.  Decreased ROM of neck due to pain in the R side of the neck.  Eyes: No conjunctival injection  Pulm: CTAB, no increased work of breathing  CV: Regular rate and rhythm, no murmur noted  Abdomen: Nondistended, non-tender to palpation  MSK: No LE edema  Skin: No rash noted  Neuro: No facial asymmetry, grossly normal movements of arms and legs.  Mentating well  Psychiatric: Cooperative    ED Course   Procedures  Labs Reviewed   C-REACTIVE PROTEIN - Abnormal; Notable for the following components:       Result Value    CRP 50.2 (*)     All other components within normal limits   CK - Abnormal; Notable for the following components:     (*)     All other components within normal limits   CBC W/ AUTO DIFFERENTIAL - Abnormal; Notable for the following components:    MCH 26.7 (*)     MCHC 30.4 (*)     RDW 14.6 (*)     All other components within normal limits   ISTAT PROCEDURE          Imaging Results          MRI Cervical Spine Without Contrast (Final result)  Result time 02/05/22 03:48:07   Procedure changed from MRI Cervical Spine W WO Cont     Final result by Cristhian Escobar MD (02/05/22 03:48:07)                 Impression:      Abnormal vertebral body signal at C5-C7 most suggestive of Modic degenerative  changes.  No epidural fluid collection or other noncontrast findings to specifically suggest spondylodiscitis.    Limited study secondary to significant metallic artifact from patient's braces.  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    Multilevel cervical spondylosis most significant at C5-6 and C6-7 with mild-to-moderate spinal canal stenosis and multilevel neural foraminal narrowing as above.    Incompletely visualized retropharyngeal/prevertebral effusion, fully evaluated on recent CT.    Electronically signed by resident: Lisa Adair  Date:    02/05/2022  Time:    02:54    Electronically signed by: Cristhian Escobar MD  Date:    02/05/2022  Time:    03:48             Narrative:    EXAMINATION:  MRI CERVICAL SPINE WITHOUT CONTRAST    CLINICAL HISTORY:  Neck pain, acute, infection suspected;    TECHNIQUE:  Multiplanar, multisequence MR images of the cervical spine were performed without the administration of contrast.    COMPARISON:  CT soft tissue neck 02/04/2022    FINDINGS:  Examination limited secondary to significant artifact from patient's braces.    Prevertebral space the level of the C5 vertebral body is obscured.    C1-C2: Unable to evaluate.    Alignment: Straightening of the cervical lordosis.    Vertebrae: No abnormal marrow signal to suggest fracture or infiltrative process in the visualized vertebra.  C5-6 and C6-7 Modic type endplate changes and associated STIR signal suggestive of active degenerative disease.    Discs: Multilevel disc space narrowing, most pronounced at C5-C6 and C6-C7.    Cord: Normal.    Skull base: Normal.    Partially visualized intracranial components look unremarkable.    No abnormal epidural collection is identified.    Degenerative findings:    C2-C3: C2-C3 disc space is obscured by metallic artifact.  No severe central canal stenosis is identified at this level.    C3-C4: C3-C4 disc space is partially  obscured by metallic artifact.  No severe central canal stenosis is identified at this level.    C4-C5: Small posterior disc osteophyte complex and uncovertebral joint hypertrophy result in mild spinal canal stenosis with partial effacement of the ventral thecal sac.  There is mild left neural foraminal narrowing.    C5-C6: Posterior disc osteophyte complex extends approximately 4 mm into the central canal contributing to mild-to-moderate spinal canal stenosis with near complete effacement of the ventral thecal sac.  There is also uncovertebral joint hypertrophy which contributes to mild-to-moderate bilateral neural foraminal narrowing.    C6-C7: Posterior disc osteophyte complex extends approximately 4 mm into the central canal contributing to mild-to-moderate spinal canal stenosis with near complete effacement of the ventral thecal sac.  There is also uncovertebral joint hypertrophy which contributes to moderate left greater than right neural foraminal narrowing.    C7-T1: No spinal canal stenosis or neural foraminal narrowing.    Paraspinal muscles & soft tissues: Incompletely characterized retropharyngeal/prevertebral effusion along the anterior aspect of the cervical spine, partially extending along the anterior aspect of the upper thoracic spine.  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                               CT Soft Tissue Neck With Contrast (Final result)  Result time 02/05/22 00:29:24    Final result by Cristhian Escobar MD (02/05/22 00:29:24)                 Impression:      Findings suggestive of acute calcific tendinitis of the longus coli muscle, including a moderate-sized prevertebral effusion extending from C1-C5.    Moderate-sized relatively well-defined lucency involving the inferior endplate at C6, most likely related to degenerative change.  Although the prevertebral effusion is strongly favored to represent sequela of  calcific tendinitis, consider follow-up with cervical spine MRI if there are risk factors or specific clinical concern for spondylodiscitis.    No rim enhancing fluid collection to suggest drainable abscess.    Electronically signed by resident: Lisa Adair  Date:    02/04/2022  Time:    23:39    Electronically signed by: Cristhian Escobar MD  Date:    02/05/2022  Time:    00:29             Narrative:    EXAMINATION:  CT SOFT TISSUE NECK WITH CONTRAST    CLINICAL HISTORY:  Neck abscess, deep tissue;    TECHNIQUE:  Low dose axial images as well as sagittal and coronal reconstructions were performed from the skull base to the clavicles following the intravenous administration of 75 mL of Omnipaque 350.    COMPARISON:  CT head 02/04/2022    FINDINGS:  Skull Base and Brain (limited evaluation): No acute abnormality.  Incidentally noted dental braces.    Sinuses and Mastoid Air Cells: Essentially clear.    Salivary Glands: Parotid and submandibular glands are bilaterally symmetric and demonstrate no gross abnormalities.    Cervical Lymph Nodes: No pathologic enlargement.    Pharynx/Larynx: Low-density prevertebral fluid collection extending from the level of C1 to C5.  This collection measures up to 1 cm in maximum AP thickness.  There is no adjacent enhancement to suggest abscess.    Thyroid: Normal in size and configuration.    Vasculature (limited evaluation): Vascular structures of the neck appear patent    Upper Airways and Lungs: Trachea is midline and the proximal airways are patent.  Mild atelectasis otherwise lung apices are clear.    Vasculature: Left-sided aortic arch with a two vessel branching pattern.    Bones: Mild degenerative changes of the cervical spine, most pronounced at C6-C7.  Prominent posterior disc osteophyte complexes at C5-C6 and C6-C7 with probable mild central canal stenosis and mild left greater than right neural foraminal narrowing.  There is a relatively well-defined lucency involving  the inferior endplate of C6, measuring roughly 8 x 6 mm in size.  No abnormal widening of the C6-C7 disc space and no focal abnormality of the superior endplate at C7.  No acute fracture.  Amorphous dystrophic calcification anterior to the C2 vertebral body, along the inferior aspect of the anterior arch of C1.                               CT Head Without Contrast (Final result)  Result time 02/04/22 22:50:40    Final result by Cristhian Escobar MD (02/04/22 22:50:40)                 Impression:      No CT evidence of acute intracranial abnormality.      Electronically signed by: Cristhian Escobar MD  Date:    02/04/2022  Time:    22:50             Narrative:    EXAMINATION:  CT HEAD WITHOUT CONTRAST    CLINICAL HISTORY:  Headache, new or worsening (Age >= 50y);    TECHNIQUE:  Low dose axial images were obtained through the head.  Coronal and sagittal reformations were also performed. Contrast was not administered.    COMPARISON:  None.    FINDINGS:  No evidence of acute territorial infarct, hemorrhage, mass effect, or midline shift.    Ventricles are normal in size and configuration.    No displaced calvarial fracture.    Remote appearing deformity of the right medial orbital wall.  Visualized paranasal sinuses and mastoid air cells are essentially clear.                                 Medications   diazePAM tablet 5 mg (5 mg Oral Given 2/4/22 2123)   iohexoL (OMNIPAQUE 350) injection 75 mL (75 mLs Intravenous Given 2/4/22 2242)   ibuprofen tablet 600 mg (600 mg Oral Given 2/5/22 0047)     Medical Decision Making:   History:   Old Medical Records: I decided to obtain old medical records.  Initial Assessment:   She is overall well-appearing with reassuring vitals.  She does have significant muscle spasm the right side of the neck.  Enough that I am concerned about deep neck space infection.  She does not have other findings to suggest meningitis.  The onset of pain has been gradual and subarachnoid hemorrhage is  less likely.  Differential Diagnosis:   Deep neck space infection, muscle strain, ligamentous injury, fracture, cervicogenic headache, SAH, tumor, meningitis  Clinical Tests:   Lab Tests: Ordered and Reviewed  Radiological Study: Ordered and Reviewed  Is        Scribe Attestation:   Scribe #1: I performed the above scribed service and the documentation accurately describes the services I performed. I attest to the accuracy of the note.                Lab results significant for mild elevation in CK, CRP is elevated at 50, white blood cell count 43204 without left shift.  Electrolytes unremarkable.  CT of the neck soft tissues and CT of the head were done.  CT of the head showed no evidence of bleed or other acute pathology.  CT of the neck shows evidence a prevertebral effusion felt to be most likely due to longus colli tendinitis, radiologist recommended considering MRI of the C-spine if concern for infectious pathology.  Patient has been on NSAIDs for multiple days now with continued progression of symptoms.  I did share decision making with her regarding further imaging with MRI and she did not feel safe going home without definitive imaging for infectious pathology.  I feel that this is reasonable given severity of symptoms.  If MRI is negative for infectious pathology I would feel reassured given that patient has not had much in terms of systemic symptoms.  Patient signed out to the oncoming physician with instructions to follow up on MRI results and disposition accordingly.      Clinical Impression:   Final diagnoses:  [M65.20] Calcific tendonitis (Primary)          ED Disposition Condition    Discharge Stable        ED Prescriptions     Medication Sig Dispense Start Date End Date Auth. Provider    methocarbamoL (ROBAXIN) 500 MG Tab Take 2 tablets (1,000 mg total) by mouth every 8 (eight) hours as needed (spasms). 30 tablet 2/5/2022 2/10/2022 Marbin Mills MD    ibuprofen (ADVIL,MOTRIN) 600 MG tablet Take 1  tablet (600 mg total) by mouth every 6 (six) hours as needed for Pain. 20 tablet 2/5/2022  Marbin Mills MD        Follow-up Information     Follow up With Specialties Details Why Contact Info    [facility] Emergency Medicine  As needed [address]  [facility] [id number] [phone]           Stan Rich MD  02/06/22 [id number]

## 2022-06-06 ENCOUNTER — OFFICE VISIT (OUTPATIENT)
Dept: OBSTETRICS AND GYNECOLOGY | Facility: CLINIC | Age: 52
End: 2022-06-06
Attending: OBSTETRICS & GYNECOLOGY
Payer: MEDICAID

## 2022-06-06 ENCOUNTER — LAB VISIT (OUTPATIENT)
Dept: LAB | Facility: OTHER | Age: 52
End: 2022-06-06
Attending: OBSTETRICS & GYNECOLOGY
Payer: MEDICAID

## 2022-06-06 VITALS
WEIGHT: 196.19 LBS | DIASTOLIC BLOOD PRESSURE: 90 MMHG | HEIGHT: 63 IN | BODY MASS INDEX: 34.76 KG/M2 | SYSTOLIC BLOOD PRESSURE: 170 MMHG

## 2022-06-06 DIAGNOSIS — I10 CHRONIC HYPERTENSION: ICD-10-CM

## 2022-06-06 DIAGNOSIS — R20.0 HAND NUMBNESS: ICD-10-CM

## 2022-06-06 DIAGNOSIS — Z01.419 WELL WOMAN EXAM WITH ROUTINE GYNECOLOGICAL EXAM: Primary | ICD-10-CM

## 2022-06-06 DIAGNOSIS — R19.00 PELVIC MASS: ICD-10-CM

## 2022-06-06 DIAGNOSIS — Z01.419 WELL WOMAN EXAM WITH ROUTINE GYNECOLOGICAL EXAM: ICD-10-CM

## 2022-06-06 DIAGNOSIS — Z12.39 SCREENING BREAST EXAMINATION: ICD-10-CM

## 2022-06-06 DIAGNOSIS — Z12.11 COLON CANCER SCREENING: ICD-10-CM

## 2022-06-06 LAB
25(OH)D3+25(OH)D2 SERPL-MCNC: 9 NG/ML (ref 30–96)
ALBUMIN SERPL BCP-MCNC: 4.2 G/DL (ref 3.5–5.2)
ALP SERPL-CCNC: 69 U/L (ref 55–135)
ALT SERPL W/O P-5'-P-CCNC: 26 U/L (ref 10–44)
ANION GAP SERPL CALC-SCNC: 13 MMOL/L (ref 8–16)
AST SERPL-CCNC: 27 U/L (ref 10–40)
BASOPHILS # BLD AUTO: 0.02 K/UL (ref 0–0.2)
BASOPHILS NFR BLD: 0.3 % (ref 0–1.9)
BILIRUB SERPL-MCNC: 0.3 MG/DL (ref 0.1–1)
BUN SERPL-MCNC: 24 MG/DL (ref 6–20)
CALCIUM SERPL-MCNC: 10.6 MG/DL (ref 8.7–10.5)
CHLORIDE SERPL-SCNC: 105 MMOL/L (ref 95–110)
CO2 SERPL-SCNC: 27 MMOL/L (ref 23–29)
CREAT SERPL-MCNC: 1.1 MG/DL (ref 0.5–1.4)
DIFFERENTIAL METHOD: ABNORMAL
EOSINOPHIL # BLD AUTO: 0.1 K/UL (ref 0–0.5)
EOSINOPHIL NFR BLD: 1.1 % (ref 0–8)
ERYTHROCYTE [DISTWIDTH] IN BLOOD BY AUTOMATED COUNT: 14.5 % (ref 11.5–14.5)
EST. GFR  (AFRICAN AMERICAN): >60 ML/MIN/1.73 M^2
EST. GFR  (NON AFRICAN AMERICAN): 58 ML/MIN/1.73 M^2
ESTIMATED AVG GLUCOSE: 114 MG/DL (ref 68–131)
GLUCOSE SERPL-MCNC: 99 MG/DL (ref 70–110)
HBA1C MFR BLD: 5.6 % (ref 4–5.6)
HCT VFR BLD AUTO: 43.7 % (ref 37–48.5)
HGB BLD-MCNC: 13.7 G/DL (ref 12–16)
IMM GRANULOCYTES # BLD AUTO: 0.01 K/UL (ref 0–0.04)
IMM GRANULOCYTES NFR BLD AUTO: 0.1 % (ref 0–0.5)
LYMPHOCYTES # BLD AUTO: 1.5 K/UL (ref 1–4.8)
LYMPHOCYTES NFR BLD: 21.2 % (ref 18–48)
MCH RBC QN AUTO: 27.1 PG (ref 27–31)
MCHC RBC AUTO-ENTMCNC: 31.4 G/DL (ref 32–36)
MCV RBC AUTO: 87 FL (ref 82–98)
MONOCYTES # BLD AUTO: 0.4 K/UL (ref 0.3–1)
MONOCYTES NFR BLD: 5.7 % (ref 4–15)
NEUTROPHILS # BLD AUTO: 5 K/UL (ref 1.8–7.7)
NEUTROPHILS NFR BLD: 71.6 % (ref 38–73)
NRBC BLD-RTO: 0 /100 WBC
PLATELET # BLD AUTO: 266 K/UL (ref 150–450)
PMV BLD AUTO: 10.4 FL (ref 9.2–12.9)
POTASSIUM SERPL-SCNC: 4 MMOL/L (ref 3.5–5.1)
PROT SERPL-MCNC: 8.4 G/DL (ref 6–8.4)
RBC # BLD AUTO: 5.05 M/UL (ref 4–5.4)
SODIUM SERPL-SCNC: 145 MMOL/L (ref 136–145)
T4 FREE SERPL-MCNC: 0.97 NG/DL (ref 0.71–1.51)
TSH SERPL DL<=0.005 MIU/L-ACNC: 1.45 UIU/ML (ref 0.4–4)
WBC # BLD AUTO: 6.98 K/UL (ref 3.9–12.7)

## 2022-06-06 PROCEDURE — 82306 VITAMIN D 25 HYDROXY: CPT | Performed by: OBSTETRICS & GYNECOLOGY

## 2022-06-06 PROCEDURE — 99213 OFFICE O/P EST LOW 20 MIN: CPT | Mod: PBBFAC | Performed by: OBSTETRICS & GYNECOLOGY

## 2022-06-06 PROCEDURE — 36415 COLL VENOUS BLD VENIPUNCTURE: CPT | Performed by: OBSTETRICS & GYNECOLOGY

## 2022-06-06 PROCEDURE — 3080F PR MOST RECENT DIASTOLIC BLOOD PRESSURE >= 90 MM HG: ICD-10-PCS | Mod: CPTII,,, | Performed by: OBSTETRICS & GYNECOLOGY

## 2022-06-06 PROCEDURE — 99999 PR PBB SHADOW E&M-EST. PATIENT-LVL III: CPT | Mod: PBBFAC,,, | Performed by: OBSTETRICS & GYNECOLOGY

## 2022-06-06 PROCEDURE — 99396 PREV VISIT EST AGE 40-64: CPT | Mod: S$PBB,,, | Performed by: OBSTETRICS & GYNECOLOGY

## 2022-06-06 PROCEDURE — 3008F PR BODY MASS INDEX (BMI) DOCUMENTED: ICD-10-PCS | Mod: CPTII,,, | Performed by: OBSTETRICS & GYNECOLOGY

## 2022-06-06 PROCEDURE — 3080F DIAST BP >= 90 MM HG: CPT | Mod: CPTII,,, | Performed by: OBSTETRICS & GYNECOLOGY

## 2022-06-06 PROCEDURE — 84439 ASSAY OF FREE THYROXINE: CPT | Performed by: OBSTETRICS & GYNECOLOGY

## 2022-06-06 PROCEDURE — 3077F PR MOST RECENT SYSTOLIC BLOOD PRESSURE >= 140 MM HG: ICD-10-PCS | Mod: CPTII,,, | Performed by: OBSTETRICS & GYNECOLOGY

## 2022-06-06 PROCEDURE — 83036 HEMOGLOBIN GLYCOSYLATED A1C: CPT | Performed by: OBSTETRICS & GYNECOLOGY

## 2022-06-06 PROCEDURE — 80053 COMPREHEN METABOLIC PANEL: CPT | Performed by: OBSTETRICS & GYNECOLOGY

## 2022-06-06 PROCEDURE — 1159F MED LIST DOCD IN RCRD: CPT | Mod: CPTII,,, | Performed by: OBSTETRICS & GYNECOLOGY

## 2022-06-06 PROCEDURE — 85025 COMPLETE CBC W/AUTO DIFF WBC: CPT | Performed by: OBSTETRICS & GYNECOLOGY

## 2022-06-06 PROCEDURE — 99396 PR PREVENTIVE VISIT,EST,40-64: ICD-10-PCS | Mod: S$PBB,,, | Performed by: OBSTETRICS & GYNECOLOGY

## 2022-06-06 PROCEDURE — 84443 ASSAY THYROID STIM HORMONE: CPT | Performed by: OBSTETRICS & GYNECOLOGY

## 2022-06-06 PROCEDURE — 3008F BODY MASS INDEX DOCD: CPT | Mod: CPTII,,, | Performed by: OBSTETRICS & GYNECOLOGY

## 2022-06-06 PROCEDURE — 3077F SYST BP >= 140 MM HG: CPT | Mod: CPTII,,, | Performed by: OBSTETRICS & GYNECOLOGY

## 2022-06-06 PROCEDURE — 99999 PR PBB SHADOW E&M-EST. PATIENT-LVL III: ICD-10-PCS | Mod: PBBFAC,,, | Performed by: OBSTETRICS & GYNECOLOGY

## 2022-06-06 PROCEDURE — 1159F PR MEDICATION LIST DOCUMENTED IN MEDICAL RECORD: ICD-10-PCS | Mod: CPTII,,, | Performed by: OBSTETRICS & GYNECOLOGY

## 2022-06-06 RX ORDER — IBUPROFEN AND FAMOTIDINE 26.6; 8 MG/1; MG/1
TABLET ORAL
COMMUNITY

## 2022-06-06 RX ORDER — AMLODIPINE BESYLATE 5 MG/1
5 TABLET ORAL DAILY
Qty: 30 TABLET | Refills: 11 | Status: SHIPPED | OUTPATIENT
Start: 2022-06-06 | End: 2023-02-09 | Stop reason: SDUPTHER

## 2022-06-06 NOTE — PROGRESS NOTES
"  CC: Well woman exam    Mau Tovar is a 51 y.o. female  presents for well woman exam.  LMP: No LMP recorded (lmp unknown). Patient has had a hysterectomy. due to severe amenia, heavy menses.  History of  x 2      Past Medical History:   Diagnosis Date    Chronic hypertension      Past Surgical History:   Procedure Laterality Date    HYSTERECTOMY       Social History     Socioeconomic History    Marital status: Single   Tobacco Use    Smoking status: Never Smoker    Smokeless tobacco: Never Used   Substance and Sexual Activity    Alcohol use: Not Currently    Sexual activity: Not Currently   Social History Narrative    Drives school buses.     Family History   Problem Relation Age of Onset    No Known Problems Mother     No Known Problems Father     Breast cancer Neg Hx     Ovarian cancer Neg Hx     Colon cancer Neg Hx      OB History        2    Para        Term                AB        Living   2       SAB        IAB        Ectopic        Multiple        Live Births   2                 BP (!) 170/90   Ht 5' 3" (1.6 m)   Wt 89 kg (196 lb 3.4 oz)   LMP  (LMP Unknown)   BMI 34.76 kg/m²       ROS:  GENERAL: Denies weight gain or weight loss. Feeling well overall.   SKIN: Denies rash or lesions.   HEAD: Denies head injury or headache.   NODES: Denies enlarged lymph nodes.   CHEST: Denies chest pain or shortness of breath.   CARDIOVASCULAR: Denies palpitations or left sided chest pain.   ABDOMEN: No abdominal pain, constipation, diarrhea, nausea, vomiting or rectal bleeding.   URINARY: No frequency, dysuria, hematuria, or burning on urination.  REPRODUCTIVE: See HPI.   BREASTS: The patient performs breast self-examination and denies pain, lumps, or nipple discharge.   HEMATOLOGIC: No easy bruisability or excessive bleeding.   MUSCULOSKELETAL: Denies joint pain or swelling.   NEUROLOGIC: Denies syncope or weakness.   PSYCHIATRIC: Denies depression, anxiety or mood " swings.    PHYSICAL EXAM:  APPEARANCE: Well nourished, well developed, in no acute distress.  AFFECT: WNL, alert and oriented x 3  SKIN: No acne or hirsutism  NECK: Neck symmetric without masses or thyromegaly  NODES: No inguinal, cervical, axillary, or femoral lymph node enlargement  CHEST: Good respiratory effect  ABDOMEN: Soft.  No tenderness or masses.  No hepatosplenomegaly.  No hernias.  BREASTS: Symmetrical, no skin changes or visible lesions.  No palpable masses, nipple discharge bilaterally.  PELVIC: Normal external genitalia without lesions.  Normal hair distribution.  Adequate perineal body, normal urethral meatus.  Vagina moist and well rugated without lesions, bartholin cyst noted right, no discharge.  Cervix and uterus surgically absent, pressure at vaginal cuff left.  Adnexa without masses or tenderness.    EXTREMITIES: No edema.    Well woman exam with routine gynecological exam  -     CBC Auto Differential; Future; Expected date: 06/06/2022  -     Comprehensive Metabolic Panel; Future; Expected date: 06/06/2022  -     Hemoglobin A1C; Future; Expected date: 06/06/2022  -     TSH; Future; Expected date: 06/06/2022  -     T4, Free; Future; Expected date: 06/06/2022  -     Vitamin D; Future; Expected date: 06/06/2022    Chronic hypertension  -     Ambulatory referral/consult to Internal Medicine; Future; Expected date: 06/13/2022    Screening breast examination  -     Mammo Digital Screening Bilat; Future; Expected date: 06/06/2022    Colon cancer screening  -     Case Request Endoscopy: COLONOSCOPY    Pelvic mass  -     US Pelvis Comp with Transvag NON-OB (xpd; Future; Expected date: 06/06/2022    Hand numbness  -     Ambulatory referral/consult to Orthopedics; Future; Expected date: 06/13/2022    Other orders  -     amLODIPine (NORVASC) 5 MG tablet; Take 1 tablet (5 mg total) by mouth once daily.  Dispense: 30 tablet; Refill: 11            Patient was counseled today on A.C.S. Pap guidelines and  recommendations for yearly pelvic exams, mammograms and monthly self breast exams; to see her PCP for other health maintenance.     No follow-ups on file.

## 2022-06-07 ENCOUNTER — PATIENT MESSAGE (OUTPATIENT)
Dept: OBSTETRICS AND GYNECOLOGY | Facility: CLINIC | Age: 52
End: 2022-06-07
Payer: MEDICAID

## 2022-06-07 RX ORDER — ERGOCALCIFEROL 1.25 MG/1
50000 CAPSULE ORAL
Qty: 51 CAPSULE | Refills: 0 | Status: SHIPPED | OUTPATIENT
Start: 2022-06-07 | End: 2023-06-07

## 2022-06-11 ENCOUNTER — HOSPITAL ENCOUNTER (OUTPATIENT)
Dept: RADIOLOGY | Facility: OTHER | Age: 52
Discharge: HOME | End: 2022-06-11
Attending: OBSTETRICS & GYNECOLOGY
Payer: MEDICAID

## 2022-06-11 DIAGNOSIS — R19.00 PELVIC MASS: ICD-10-CM

## 2022-06-11 PROCEDURE — 76830 US PELVIS COMP WITH TRANSVAG NON-OB (XPD): ICD-10-PCS | Mod: 26,,, | Performed by: INTERNAL MEDICINE

## 2022-06-11 PROCEDURE — 76830 TRANSVAGINAL US NON-OB: CPT | Mod: 26,,, | Performed by: INTERNAL MEDICINE

## 2022-06-11 PROCEDURE — 76856 US EXAM PELVIC COMPLETE: CPT | Mod: 26,,, | Performed by: INTERNAL MEDICINE

## 2022-06-11 PROCEDURE — 76856 US PELVIS COMP WITH TRANSVAG NON-OB (XPD): ICD-10-PCS | Mod: 26,,, | Performed by: INTERNAL MEDICINE

## 2022-06-11 PROCEDURE — 76830 TRANSVAGINAL US NON-OB: CPT | Mod: TC

## 2023-02-08 ENCOUNTER — PATIENT MESSAGE (OUTPATIENT)
Dept: OBSTETRICS AND GYNECOLOGY | Facility: CLINIC | Age: 53
End: 2023-02-08
Payer: MEDICAID

## 2023-02-08 NOTE — TELEPHONE ENCOUNTER
Patient is scheduled to see a primary care provider on tomorrow for elevated blood pressure readings.

## 2023-02-09 ENCOUNTER — OFFICE VISIT (OUTPATIENT)
Dept: PRIMARY CARE CLINIC | Facility: CLINIC | Age: 53
End: 2023-02-09
Payer: MEDICAID

## 2023-02-09 VITALS
OXYGEN SATURATION: 98 % | HEART RATE: 81 BPM | WEIGHT: 201.06 LBS | SYSTOLIC BLOOD PRESSURE: 150 MMHG | BODY MASS INDEX: 35.62 KG/M2 | DIASTOLIC BLOOD PRESSURE: 82 MMHG | HEIGHT: 63 IN

## 2023-02-09 DIAGNOSIS — I10 UNCONTROLLED HYPERTENSION: Primary | ICD-10-CM

## 2023-02-09 PROCEDURE — 3008F BODY MASS INDEX DOCD: CPT | Mod: CPTII,,, | Performed by: FAMILY MEDICINE

## 2023-02-09 PROCEDURE — 3079F PR MOST RECENT DIASTOLIC BLOOD PRESSURE 80-89 MM HG: ICD-10-PCS | Mod: CPTII,,, | Performed by: FAMILY MEDICINE

## 2023-02-09 PROCEDURE — 1159F PR MEDICATION LIST DOCUMENTED IN MEDICAL RECORD: ICD-10-PCS | Mod: CPTII,,, | Performed by: FAMILY MEDICINE

## 2023-02-09 PROCEDURE — 99204 PR OFFICE/OUTPT VISIT, NEW, LEVL IV, 45-59 MIN: ICD-10-PCS | Mod: S$PBB,,, | Performed by: FAMILY MEDICINE

## 2023-02-09 PROCEDURE — 99999 PR PBB SHADOW E&M-EST. PATIENT-LVL III: ICD-10-PCS | Mod: PBBFAC,,, | Performed by: FAMILY MEDICINE

## 2023-02-09 PROCEDURE — 99204 OFFICE O/P NEW MOD 45 MIN: CPT | Mod: S$PBB,,, | Performed by: FAMILY MEDICINE

## 2023-02-09 PROCEDURE — 3008F PR BODY MASS INDEX (BMI) DOCUMENTED: ICD-10-PCS | Mod: CPTII,,, | Performed by: FAMILY MEDICINE

## 2023-02-09 PROCEDURE — 1159F MED LIST DOCD IN RCRD: CPT | Mod: CPTII,,, | Performed by: FAMILY MEDICINE

## 2023-02-09 PROCEDURE — 1160F PR REVIEW ALL MEDS BY PRESCRIBER/CLIN PHARMACIST DOCUMENTED: ICD-10-PCS | Mod: CPTII,,, | Performed by: FAMILY MEDICINE

## 2023-02-09 PROCEDURE — 3077F PR MOST RECENT SYSTOLIC BLOOD PRESSURE >= 140 MM HG: ICD-10-PCS | Mod: CPTII,,, | Performed by: FAMILY MEDICINE

## 2023-02-09 PROCEDURE — 99999 PR PBB SHADOW E&M-EST. PATIENT-LVL III: CPT | Mod: PBBFAC,,, | Performed by: FAMILY MEDICINE

## 2023-02-09 PROCEDURE — 1160F RVW MEDS BY RX/DR IN RCRD: CPT | Mod: CPTII,,, | Performed by: FAMILY MEDICINE

## 2023-02-09 PROCEDURE — 99213 OFFICE O/P EST LOW 20 MIN: CPT | Mod: PBBFAC,PN | Performed by: FAMILY MEDICINE

## 2023-02-09 PROCEDURE — 3077F SYST BP >= 140 MM HG: CPT | Mod: CPTII,,, | Performed by: FAMILY MEDICINE

## 2023-02-09 PROCEDURE — 3079F DIAST BP 80-89 MM HG: CPT | Mod: CPTII,,, | Performed by: FAMILY MEDICINE

## 2023-02-09 RX ORDER — AMLODIPINE BESYLATE 10 MG/1
10 TABLET ORAL DAILY
Qty: 30 TABLET | Refills: 0 | Status: SHIPPED | OUTPATIENT
Start: 2023-02-09 | End: 2023-03-21 | Stop reason: SDUPTHER

## 2023-02-09 NOTE — PROGRESS NOTES
Subjective:       Patient ID: Mau Tovar is a 52 y.o. female.    Chief Complaint: Hypertension ( Rx concerns )    Hypertension  This is a chronic problem. Episode onset: 5 years. The problem is uncontrolled. Associated symptoms include headaches. Pertinent negatives include no chest pain or shortness of breath. Past treatments include calcium channel blockers. The current treatment provides mild improvement. Compliance problems include psychosocial issues.  There is no history of kidney disease or CAD/MI. 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  Systolic BP >160 three weeks ago.  Currently on amlodipine 5 mg daily.  Review of Systems   Constitutional:  Negative for unexpected weight change.   Respiratory:  Negative for shortness of breath.    Cardiovascular:  Negative for chest pain and leg swelling.   Neurological:  Positive for headaches.   Psychiatric/Behavioral:  Negative for sleep disturbance. The patient is nervous/anxious.        Objective:      Physical Exam  Constitutional:       Appearance: She is well-developed. She is obese.   HENT:      Head: Normocephalic and atraumatic.   Cardiovascular:      Rate and Rhythm: Normal rate and regular rhythm.   Pulmonary:      Effort: Pulmonary effort is normal.      Breath sounds: Normal breath sounds.   Abdominal:      General: Bowel sounds are normal.      Palpations: Abdomen is soft.      Tenderness: There is no abdominal tenderness.   Musculoskeletal:         General: No deformity.      Cervical back: Normal range of motion and neck supple.      Right lower leg: No edema.      Left lower leg: No edema.   Skin:     General: Skin is warm and dry.   Neurological:      Mental Status: She is alert.       Assessment:       Problem List Items Addressed This Visit          Cardiac/Vascular    Uncontrolled hypertension - Primary    Relevant Medications    amLODIPine (NORVASC) 10 MG tablet    Other Relevant Orders    Hypertension  [facility] (Completed)    Hypertension [facility] Assign Onboarding Questionnaires (Completed)       Plan:     Mau was seen today for hypertension.    Diagnoses and all orders for this visit:    Uncontrolled hypertension  Blood pressure not at goal.  Recommend increasing amlodipine 10 mg daily.  -     Hypertension Digital Medicine (HDMP) Enrollment Order  -     Hypertension Digital Medicine ([facility]): Assign Onboarding Questionnaires  -     amLODIPine (NORVASC) 10 MG tablet; Take 1 tablet (10 mg total) by mouth once daily

## 2023-04-21 ENCOUNTER — PATIENT OUTREACH (OUTPATIENT)
Dept: ADMINISTRATIVE | Facility: HOSPITAL | Age: 53
End: 2023-04-21
Payer: MEDICAID

## 2023-04-21 ENCOUNTER — PATIENT MESSAGE (OUTPATIENT)
Dept: ADMINISTRATIVE | Facility: HOSPITAL | Age: 53
End: 2023-04-21
Payer: MEDICAID

## 2023-04-21 ENCOUNTER — OCCUPATIONAL HEALTH (OUTPATIENT)
Dept: URGENT CARE | Facility: CLINIC | Age: 53
End: 2023-04-21

## 2023-04-21 DIAGNOSIS — Z12.11 ENCOUNTER FOR COLORECTAL CANCER SCREENING: Primary | ICD-10-CM

## 2023-04-21 DIAGNOSIS — Z02.83 ENCOUNTER FOR DRUG SCREENING: Primary | ICD-10-CM

## 2023-04-21 DIAGNOSIS — Z12.12 ENCOUNTER FOR COLORECTAL CANCER SCREENING: Primary | ICD-10-CM

## 2023-04-21 PROCEDURE — 80306 OOH DOT DRUG SCREEN: ICD-10-PCS | Mod: S$GLB,,,

## 2023-04-21 PROCEDURE — 80306 DRUG TEST PRSMV INSTRMNT: CPT | Mod: S$GLB,,,

## 2023-06-05 DIAGNOSIS — I10 UNCONTROLLED HYPERTENSION: ICD-10-CM

## 2023-06-05 RX ORDER — AMLODIPINE BESYLATE 10 MG/1
TABLET ORAL
Qty: 30 TABLET | Refills: 0 | OUTPATIENT
Start: 2023-06-05

## 2023-06-05 NOTE — TELEPHONE ENCOUNTER
No care due was identified.  [facility] Embedded Care Due Messages. Reference number: [id number].   6/05/2023 10:21:41 AM CDT

## 2023-06-06 NOTE — TELEPHONE ENCOUNTER
Refill Routing Note   Refill Routing Note   Medication(s) are not appropriate for processing by [facility] for the following reason(s):      Required vitals abnormal    [facility] action(s):  Defer None identified     Medication Therapy Plan: BP 2/09/23 (!) 150/82  Medication reconciliation completed: No     Appointments  past 12m or future 3m with PCP    Date Provider   Last Visit   2/9/2023 Myrna Jenkins MD   Next Visit   Visit date not found Myrna Jenkins MD   ED visits in past 90 days: 0        Note composed:7:42 PM 06/05/2023

## 2023-06-07 RX ORDER — FLUCONAZOLE 150 MG/1
TABLET ORAL
Qty: 2 TABLET | Refills: 0 | Status: SHIPPED | OUTPATIENT
Start: 2023-06-07

## 2023-07-19 ENCOUNTER — OCCUPATIONAL HEALTH (OUTPATIENT)
Dept: URGENT CARE | Facility: CLINIC | Age: 53
End: 2023-07-19

## 2023-07-19 DIAGNOSIS — Z02.89 ENCOUNTER FOR EXAMINATION REQUIRED BY DEPARTMENT OF TRANSPORTATION (DOT): Primary | ICD-10-CM

## 2023-07-19 PROCEDURE — 99499 UNLISTED E&M SERVICE: CPT | Mod: S$GLB,,, | Performed by: PHYSICIAN ASSISTANT

## 2023-07-19 PROCEDURE — 99499 PHYSICAL, RECERT DOT/CDL: ICD-10-PCS | Mod: S$GLB,,, | Performed by: PHYSICIAN ASSISTANT

## 2023-08-10 ENCOUNTER — PATIENT MESSAGE (OUTPATIENT)
Dept: ADMINISTRATIVE | Facility: HOSPITAL | Age: 53
End: 2023-08-10
Payer: MEDICAID

## 2023-08-21 ENCOUNTER — PATIENT MESSAGE (OUTPATIENT)
Dept: ADMINISTRATIVE | Facility: HOSPITAL | Age: 53
End: 2023-08-21
Payer: MEDICAID

## 2023-09-27 ENCOUNTER — PATIENT MESSAGE (OUTPATIENT)
Dept: PRIMARY CARE CLINIC | Facility: CLINIC | Age: 53
End: 2023-09-27
Payer: MEDICAID

## 2023-10-24 ENCOUNTER — TELEPHONE (OUTPATIENT)
Dept: PRIMARY CARE CLINIC | Facility: CLINIC | Age: 53
End: 2023-10-24
Payer: MEDICAID

## 2023-10-24 DIAGNOSIS — I10 UNCONTROLLED HYPERTENSION: ICD-10-CM

## 2023-10-24 NOTE — TELEPHONE ENCOUNTER
No care due was identified.  [facility] Embedded Care Due Messages. Reference number: [id number].   10/24/2023 4:45:44 PM CDT

## 2023-10-24 NOTE — TELEPHONE ENCOUNTER
----- Message from Cisco Alberto sent at 10/24/2023 12:14 PM CDT -----  Type:  RX Refill Request    Who Called: pt  Refill or New Rx:refill  RX Name and Strength:amLODIPine (NORVASC) 10 MG tablet  Would the patient rather a call back or a response via [facility]? Call  Best Call Back Number:[phone]  Additional Information: pt states she really needs her Rx and there's no available appts with provider that works around her work scheduled. Pt states she would [facility] her Rx [facility] [place] [facility] [address]

## 2023-10-24 NOTE — TELEPHONE ENCOUNTER
Called patient no answer left message to call our office regarding an appointment for medication refill.

## 2023-10-25 RX ORDER — AMLODIPINE BESYLATE 10 MG/1
TABLET ORAL
Qty: 30 TABLET | Refills: 0 | Status: SHIPPED | OUTPATIENT
Start: 2023-10-25

## 2023-10-25 NOTE — TELEPHONE ENCOUNTER
Refill Routing Note     Refill Routing Note   Medication(s) are not appropriate for processing by [facility] for the following reason(s):      Required vitals abnormal    [facility] action(s):  Defer Care Due:  None identified            Appointments  past 12m or future 3m with PCP    Date Provider   Last Visit   2/9/2023 Myrna Jenkins MD   Next Visit   10/24/2023 Myrna Jenkins MD   ED visits in past 90 days: 0        Note composed:7:02 AM 10/25/2023

## 2023-10-27 ENCOUNTER — OFFICE VISIT (OUTPATIENT)
Dept: URGENT CARE | Facility: CLINIC | Age: 53
End: 2023-10-27
Payer: MEDICAID

## 2023-10-27 VITALS
SYSTOLIC BLOOD PRESSURE: 166 MMHG | BODY MASS INDEX: 35.61 KG/M2 | HEART RATE: 74 BPM | RESPIRATION RATE: 16 BRPM | DIASTOLIC BLOOD PRESSURE: 95 MMHG | TEMPERATURE: 99 F | OXYGEN SATURATION: 97 % | HEIGHT: 63 IN | WEIGHT: 201 LBS

## 2023-10-27 DIAGNOSIS — M79.672 PAIN OF LEFT HEEL: ICD-10-CM

## 2023-10-27 DIAGNOSIS — R60.0 BILATERAL LEG EDEMA: Primary | ICD-10-CM

## 2023-10-27 DIAGNOSIS — R03.0 ELEVATED BLOOD PRESSURE READING: ICD-10-CM

## 2023-10-27 DIAGNOSIS — Z76.89 ENCOUNTER TO ESTABLISH CARE WITH NEW DOCTOR: ICD-10-CM

## 2023-10-27 PROCEDURE — 99213 OFFICE O/P EST LOW 20 MIN: CPT | Mod: S$GLB,,, | Performed by: NURSE PRACTITIONER

## 2023-10-27 PROCEDURE — 99213 PR OFFICE/OUTPT VISIT, EST, LEVL III, 20-29 MIN: ICD-10-PCS | Mod: S$GLB,,, | Performed by: NURSE PRACTITIONER

## 2023-10-27 RX ORDER — DICLOFENAC SODIUM 10 MG/G
2 GEL TOPICAL 4 TIMES DAILY
Qty: 2 G | Refills: 0 | Status: SHIPPED | OUTPATIENT
Start: 2023-10-27 | End: 2023-11-10

## 2023-10-27 NOTE — LETTER
October 27, 2023      [facility] - Urgent Care  [address], [address]  [place] [address]  Phone: [phone]  Fax: [phone]       Patient: Mau Tovar   YOB: 1970  Date of Visit: 10/27/2023    To Whom It May Concern:    Maricruz Tovar  was at [facility] on 10/27/2023. The patient may return to work/school on 10/30/2023. If you have any questions or concerns, or if I can be of further assistance, please do not hesitate to contact me.    Sincerely,    Jeramie Vargas NP

## 2023-10-27 NOTE — PROGRESS NOTES
"Subjective:      Patient ID: Mau Tovar is a 52 y.o. female.    Vitals:  height is 5' 3" (1.6 m) and weight is 91.2 kg (201 lb). Her oral temperature is 98.5 °F (36.9 °C). Her blood pressure is 166/95 (abnormal) and her pulse is 74. Her respiration is 16 and oxygen saturation is 97%.     Chief Complaint: Leg Swelling    52-year-old female presents to clinic for evaluation of bilateral lower leg swelling noticed over the last 4 days.  She denies any or fall.  She does have a history of hypertension, had been recently out of her amlodipine, recently refilled 3 days ago.  She does work as a , nose a lot of sitting.  She also reports pain to her left heel.  She denies chest pain or shortness a breath.  She is awake and alert, answers questions appropriately, no acute distress noted on today's visit.    Other  This is a new problem. Episode onset: 4 days ago. The problem has been gradually worsening. Pertinent negatives include no chest pain, chills, diaphoresis, fatigue or fever. The symptoms are aggravated by walking. Treatments tried: ibuprofen.       Constitution: Negative for activity change, appetite change, chills, sweating, fatigue and fever.   Cardiovascular:  Positive for leg swelling. Negative for chest pain.   Respiratory:  Negative for shortness of breath.    Skin:  Negative for erythema.   Neurological:  Negative for dizziness.      Objective:     Physical Exam   Constitutional: She is oriented to person, place, and time. She appears well-developed.  Non-toxic appearance. She does not appear ill.   HENT:   Head: Normocephalic and atraumatic. Head is without abrasion, without contusion and without laceration.   Ears:   Right Ear: External ear normal.   Left Ear: External ear normal.   Nose: Nose normal.   Mouth/Throat: Oropharynx is clear and moist and mucous membranes are normal.   Eyes: Conjunctivae, EOM and lids are normal.   Neck: Trachea normal and phonation normal. Neck supple. "   Cardiovascular: Normal rate, regular rhythm and normal heart sounds.   Pulmonary/Chest: Effort normal and breath sounds normal. No stridor. No respiratory distress. She has no wheezes. She has no rales.   Abdominal: Normal appearance.   Musculoskeletal: Normal range of motion.         General: No deformity. Normal range of motion.      Right lower le+ Edema present.      Left lower le+ Edema present.   Neurological: She is alert and oriented to person, place, and time.   Skin: Skin is warm, dry, intact and no rash. Capillary refill takes less than 2 seconds. No abrasion, No burn, No bruising, No erythema and No ecchymosis   Psychiatric: Her speech is normal and behavior is normal. Mood, judgment and thought content normal.   Nursing note and vitals reviewed.      Assessment:     1. Bilateral leg edema    2. Pain of left heel    3. Encounter to establish care with new doctor    4. Elevated blood pressure reading        Plan:       Bilateral leg edema    Pain of left heel  -     diclofenac sodium (VOLTAREN) 1 % Gel; Apply 2 g topically 4 (four) times daily. for 14 days  Dispense: 2 g; Refill: 0    Encounter to establish care with new doctor  -     Ambulatory referral/consult to Internal Medicine    Elevated blood pressure reading      - stressed follow-up with PCP for blood pressure medication management.  Discussed compression stockings, elevation, management of blood pressure.  Discussed low-sodium diet.  Follow-up with PCP for further evaluation.  No red flags at this time to warrant emergent evaluation, however discussed precautions.  Patient verbalized understanding and is in agreement plan.    Patient Instructions   - Follow up with your PCP or specialty clinic as directed in the next 1-2 weeks if not improved or as needed.  You can call [phone] to schedule an appointment with the appropriate provider.    - Go to the ER or seek medical attention immediately if you develop new or worsening  symptoms.    - You must understand that you have received an Urgent Care treatment only and that you may be released before all of your medical problems are known or treated.   - You, the patient, will arrange for follow up care as instructed.   - If your condition worsens or fails to improve we recommend that you receive another evaluation at the ER immediately or contact your PCP to discuss your concerns or return here.     Elevated Blood Pressure Reading  - Your blood pressure was elevated during your visit to the urgent care. It was not so high that immediate care was needed but it is recommended that you monitor your blood pressure over the next week or two to make sure that it is not staying elevated.    - Please have your blood pressure taken 2-3 times daily at different times of the day.  - Write all of those blood pressures down and record the time that they were taken.   - Keep all that information and take it with you to see your Primary Care Physician.   - According to ACEP guidelines, workup and treatment of hypertension in asymptomatic patient is not warranted:  (1) Initiating treatment for asymptomatic hypertension in the ED is not necessary when patients have follow-up.  (2) Rapidly lowering blood pressure in asymptomatic patients in the ED is unnecessary and may be harmful in some patients  (3) When ED treatment for asymptomatic hypertension is initiated, blood pressure management should attempt to gradually lower blood pressure and should not be expected to be normalized during the initial ED visit.

## 2023-10-28 NOTE — PATIENT INSTRUCTIONS
- Follow up with your PCP or specialty clinic as directed in the next 1-2 weeks if not improved or as needed.  You can call [phone] to schedule an appointment with the appropriate provider.    - Go to the ER or seek medical attention immediately if you develop new or worsening symptoms.    - You must understand that you have received an Urgent Care treatment only and that you may be released before all of your medical problems are known or treated.   - You, the patient, will arrange for follow up care as instructed.   - If your condition worsens or fails to improve we recommend that you receive another evaluation at the ER immediately or contact your PCP to discuss your concerns or return here.     Elevated Blood Pressure Reading  - Your blood pressure was elevated during your visit to the urgent care. It was not so high that immediate care was needed but it is recommended that you monitor your blood pressure over the next week or two to make sure that it is not staying elevated.    - Please have your blood pressure taken 2-3 times daily at different times of the day.  - Write all of those blood pressures down and record the time that they were taken.   - Keep all that information and take it with you to see your Primary Care Physician.   - According to ACEP guidelines, workup and treatment of hypertension in asymptomatic patient is not warranted:  (1) Initiating treatment for asymptomatic hypertension in the ED is not necessary when patients have follow-up.  (2) Rapidly lowering blood pressure in asymptomatic patients in the ED is unnecessary and may be harmful in some patients  (3) When ED treatment for asymptomatic hypertension is initiated, blood pressure management should attempt to gradually lower blood pressure and should not be expected to be normalized during the initial ED visit.

## 2023-10-30 ENCOUNTER — PATIENT MESSAGE (OUTPATIENT)
Dept: OBSTETRICS AND GYNECOLOGY | Facility: CLINIC | Age: 53
End: 2023-10-30
Payer: MEDICAID

## 2023-11-06 ENCOUNTER — CLINICAL SUPPORT (OUTPATIENT)
Dept: ENDOSCOPY | Facility: HOSPITAL | Age: 53
End: 2023-11-06
Attending: FAMILY MEDICINE
Payer: MEDICAID

## 2023-11-06 ENCOUNTER — TELEPHONE (OUTPATIENT)
Dept: ENDOSCOPY | Facility: HOSPITAL | Age: 53
End: 2023-11-06

## 2023-11-06 DIAGNOSIS — Z12.12 ENCOUNTER FOR COLORECTAL CANCER SCREENING: ICD-10-CM

## 2023-11-06 DIAGNOSIS — Z12.11 ENCOUNTER FOR COLORECTAL CANCER SCREENING: ICD-10-CM

## 2023-11-06 NOTE — TELEPHONE ENCOUNTER
Called patient for PAT appointment to schedule colonoscopy. Patient unable to schedule colonoscopy at this time due to work schedule and will call back. Phone number given to patient.

## 2023-11-08 DIAGNOSIS — I10 UNCONTROLLED HYPERTENSION: ICD-10-CM

## 2024-04-03 ENCOUNTER — PATIENT MESSAGE (OUTPATIENT)
Dept: ADMINISTRATIVE | Facility: HOSPITAL | Age: 54
End: 2024-04-03
Payer: MEDICAID

## 2024-07-09 ENCOUNTER — PATIENT MESSAGE (OUTPATIENT)
Dept: ADMINISTRATIVE | Facility: HOSPITAL | Age: 54
End: 2024-07-09
Payer: MEDICAID

## 2024-08-09 ENCOUNTER — TELEPHONE (OUTPATIENT)
Dept: PRIMARY CARE CLINIC | Facility: CLINIC | Age: 54
End: 2024-08-09
Payer: MEDICAID

## 2024-08-29 ENCOUNTER — PATIENT MESSAGE (OUTPATIENT)
Dept: PODIATRY | Facility: CLINIC | Age: 54
End: 2024-08-29
Payer: COMMERCIAL

## 2024-09-03 ENCOUNTER — OFFICE VISIT (OUTPATIENT)
Dept: PODIATRY | Facility: CLINIC | Age: 54
End: 2024-09-03
Payer: COMMERCIAL

## 2024-09-03 VITALS
HEIGHT: 63 IN | SYSTOLIC BLOOD PRESSURE: 172 MMHG | HEART RATE: 90 BPM | BODY MASS INDEX: 37.5 KG/M2 | WEIGHT: 211.63 LBS | DIASTOLIC BLOOD PRESSURE: 90 MMHG

## 2024-09-03 DIAGNOSIS — M72.2 PLANTAR FASCIITIS: ICD-10-CM

## 2024-09-03 DIAGNOSIS — M76.61 ACHILLES TENDINITIS OF RIGHT LOWER EXTREMITY: Primary | ICD-10-CM

## 2024-09-03 PROCEDURE — 99999 PR PBB SHADOW E&M-EST. PATIENT-LVL III: CPT | Mod: PBBFAC,,, | Performed by: PODIATRIST

## 2024-09-03 PROCEDURE — 3080F DIAST BP >= 90 MM HG: CPT | Mod: CPTII,S$GLB,, | Performed by: PODIATRIST

## 2024-09-03 PROCEDURE — 99203 OFFICE O/P NEW LOW 30 MIN: CPT | Mod: S$GLB,,, | Performed by: PODIATRIST

## 2024-09-03 PROCEDURE — 1159F MED LIST DOCD IN RCRD: CPT | Mod: CPTII,S$GLB,, | Performed by: PODIATRIST

## 2024-09-03 PROCEDURE — 3008F BODY MASS INDEX DOCD: CPT | Mod: CPTII,S$GLB,, | Performed by: PODIATRIST

## 2024-09-03 PROCEDURE — 3077F SYST BP >= 140 MM HG: CPT | Mod: CPTII,S$GLB,, | Performed by: PODIATRIST

## 2024-09-03 RX ORDER — PREDNISONE 10 MG/1
10 TABLET ORAL DAILY
Qty: 10 TABLET | Refills: 0 | Status: SHIPPED | OUTPATIENT
Start: 2024-09-03

## 2024-09-03 RX ORDER — MELOXICAM 15 MG/1
15 TABLET ORAL DAILY
Qty: 30 TABLET | Refills: 0 | Status: SHIPPED | OUTPATIENT
Start: 2024-09-03

## 2024-09-03 NOTE — PROGRESS NOTES
Subjective:      Patient ID: Mau Tovar is a 53 y.o. female.    Chief Complaint: Heel Pain (B/L, right is worse, sitting to standing she feels she has to limp, been going on for a while)    Pt presents today c/o pain in both but R>L heel around the achilles tendon area. Pt denies any trauma. Pt states the pain is worse when standing/weight bearing. Pt rates pain 8/10 Treatment tried none      Review of Systems   Constitutional: Negative for chills, fever and malaise/fatigue.   HENT:  Negative for hearing loss.    Cardiovascular:  Negative for claudication.   Respiratory:  Negative for shortness of breath.    Skin:  Negative for flushing and rash.   Musculoskeletal:  Negative for joint pain and myalgias.   Neurological:  Negative for loss of balance, numbness, paresthesias and sensory change.   Psychiatric/Behavioral:  Negative for altered mental status.            Objective:      Physical Exam  Vitals reviewed.   Cardiovascular:      Pulses:           Dorsalis pedis pulses are 2+ on the right side and 2+ on the left side.        Posterior tibial pulses are 2+ on the right side and 2+ on the left side.      Comments: No edema noted b/L  Musculoskeletal:         General: Tenderness present.      Comments:        Feet:      Right foot:      Protective Sensation: 5 sites tested.  5 sites sensed.      Left foot:      Protective Sensation: 5 sites tested.  5 sites sensed.   Skin:     General: Skin is warm.      Capillary Refill: Capillary refill takes 2 to 3 seconds.      Comments: Normal skin tugor noted.   No open lesion noted b/L  Skin temp is warm to warm from proximal to distal b/L.  Webspaces clean, dry, and intact     Neurological:      Mental Status: She is alert and oriented to person, place, and time.      Comments: Gross sensation intact b/L       Decreased b/L ankle joint ROM, pain with palpation of posterior 1/3 calcaneus at region of Achilles tendon insertion. moderate pain with ankle joint ROM   No  palpable dell noted  Some pain noted to plantar medial heel, right        Assessment:       Encounter Diagnoses   Name Primary?    Achilles tendinitis of right lower extremity Yes    Plantar fasciitis          Plan:       Mau was seen today for heel pain.    Diagnoses and all orders for this visit:    Achilles tendinitis of right lower extremity  -     HME - OTHER    Plantar fasciitis  -     HME - OTHER    Other orders  -     predniSONE (DELTASONE) 10 MG tablet; Take 1 tablet (10 mg total) by mouth once daily.  -     meloxicam (MOBIC) 15 MG tablet; Take 1 tablet (15 mg total) by mouth once daily.      I counseled the patient on her conditions, their implications and medical management.    Pt was advised on the etiologies and issues associated with achilles tendinitis.  Home stretching instructions given to pt  Rx prednisone 10mg x10 days  Rx mobic for 10 days  Hme ankle brace  Pt advised to ice ankle with bag of ice encompassing the entire heel until ice melts, once daily  Pt advised to decrease physical activity while achilles tendon is painful  RTC in 2 weeks or sooner if symptoms don't improve or persists      .

## 2024-09-12 ENCOUNTER — PATIENT MESSAGE (OUTPATIENT)
Dept: PODIATRY | Facility: CLINIC | Age: 54
End: 2024-09-12
Payer: COMMERCIAL

## 2024-09-18 ENCOUNTER — TELEPHONE (OUTPATIENT)
Dept: PODIATRY | Facility: CLINIC | Age: 54
End: 2024-09-18
Payer: COMMERCIAL

## 2024-09-30 RX ORDER — MELOXICAM 15 MG/1
15 TABLET ORAL
Qty: 30 TABLET | Refills: 0 | Status: SHIPPED | OUTPATIENT
Start: 2024-09-30

## 2024-09-30 RX ORDER — PREDNISONE 10 MG/1
10 TABLET ORAL
Qty: 10 TABLET | Refills: 0 | Status: SHIPPED | OUTPATIENT
Start: 2024-09-30

## 2024-11-03 ENCOUNTER — OFFICE VISIT (OUTPATIENT)
Dept: URGENT CARE | Facility: CLINIC | Age: 54
End: 2024-11-03
Payer: COMMERCIAL

## 2024-11-03 VITALS
RESPIRATION RATE: 18 BRPM | SYSTOLIC BLOOD PRESSURE: 167 MMHG | TEMPERATURE: 98 F | DIASTOLIC BLOOD PRESSURE: 92 MMHG | HEART RATE: 84 BPM | BODY MASS INDEX: 37.39 KG/M2 | WEIGHT: 211 LBS | OXYGEN SATURATION: 95 % | HEIGHT: 63 IN

## 2024-11-03 DIAGNOSIS — R07.9 CHEST PAIN, UNSPECIFIED TYPE: Primary | ICD-10-CM

## 2024-11-03 DIAGNOSIS — R06.02 SHORTNESS OF BREATH: ICD-10-CM

## 2024-11-03 LAB
CTP QC/QA: YES
SARS-COV-2 AG RESP QL IA.RAPID: NEGATIVE

## 2024-11-03 PROCEDURE — 87811 SARS-COV-2 COVID19 W/OPTIC: CPT | Mod: QW,S$GLB,,

## 2024-11-03 PROCEDURE — 93010 ELECTROCARDIOGRAM REPORT: CPT | Mod: S$GLB,,, | Performed by: INTERNAL MEDICINE

## 2024-11-03 PROCEDURE — 71046 X-RAY EXAM CHEST 2 VIEWS: CPT | Mod: S$GLB,,, | Performed by: RADIOLOGY

## 2024-11-03 PROCEDURE — 93005 ELECTROCARDIOGRAM TRACING: CPT | Mod: S$GLB,,,

## 2024-11-03 PROCEDURE — 99213 OFFICE O/P EST LOW 20 MIN: CPT | Mod: S$GLB,,,

## 2024-11-03 NOTE — PROGRESS NOTES
"[facility] Plan of Care Note    Referral source: Urgent Care      Reason for consult: Chest Pain: "53-year-old female with history of hypertension here for SOB, dyspnea on exertion, palpitations, and chest pain that started 2 days ago.  Chest pain feels like a dull ache to the left side of her chest.  It is worse with exertion.  Dyspnea and chest pain improve with rest.  Today she felt chest pain radiate to her left arm.  She felt so out of breath this morning that it was difficult for her to get dressed.  No prior history of VTE or ACS.  No family history of cardiac disease.  No history of asthma, COPD, or smoking.  No recent surgery or history of malignancy.  Denies fever, chills, dizziness, nausea or vomiting, leg swelling or pain, coughing, hemoptysis.  Hypertensive to 167/92.  Other vital signs are within normal limits.  SpO2 95%, HR 84.  Afebrile.  On exam, patient does appear uncomfortable.  No respiratory distress.  Lungs CTAB.  RRR.  No chest tenderness.  Neurologically intact without focal deficits.  Most concerning for pulmonary embolism vs ACS.  Differential also includes pneumonia, pneumothorax, myocarditis, pericarditis, pneumonia, dysrhythmia, pleurisy, costochondritis, GERD. CXR with no acute findings. EKG shows HR 73, NSR. However she has a concerning story and I believe she needs further evaluation in the emergency room. "   - EKG reviewed and is unremarkable, shows NSR, no STEMI      Disposition recommended: Emergency Department      Additional Recommendations: n/a       "

## 2024-11-03 NOTE — PROGRESS NOTES
"Subjective:      Patient ID: Mau Tovar is a 53 y.o. female.    Vitals:  height is 5' 3" (1.6 m) and weight is 95.7 kg (211 lb). Her tympanic temperature is 97.7 °F (36.5 °C). Her blood pressure is 167/92 (abnormal) and her pulse is 84. Her respiration is 18 and oxygen saturation is 95%.     Chief Complaint: Shortness of Breath    53-year-old female with history of hypertension here for SOB, dyspnea on exertion, palpitations, and chest pain that started 2 days ago.  Chest pain feels like a dull ache to the left side of her chest.  It is worse with exertion.  Also pleuritic as she feels a sharper pain when she inhales.  Dyspnea and chest pain improve with rest.  Today she felt the chest pain radiate to her left arm.  She felt so out of breath this morning that it was difficult for her to get dressed.  No prior history of VTE or ACS.  No family history of cardiac disease.  No history of asthma, COPD, or smoking.  No recent surgery or history of malignancy.  Denies fever, chills, dizziness, nausea or vomiting, leg swelling or pain, coughing, hemoptysis.    Shortness of Breath  This is a new problem. The current episode started in the past 7 days. The problem occurs intermittently. The problem has been unchanged. Associated symptoms include chest pain. Pertinent negatives include no abdominal pain, fever, headaches, hemoptysis, leg swelling, neck pain, rash, sore throat, sputum production, vomiting or wheezing. She has tried OTC cough suppressants (motrin) for the symptoms.       Constitution: Negative for chills and fever.   HENT:  Negative for congestion and sore throat.    Neck: Negative for neck pain and neck stiffness.   Cardiovascular:  Positive for chest pain, palpitations and sob on exertion. Negative for leg swelling.   Respiratory:  Positive for shortness of breath. Negative for cough, sputum production, bloody sputum, COPD, wheezing and asthma.    Gastrointestinal:  Negative for abdominal pain, nausea, " vomiting and diarrhea.   Musculoskeletal:  Negative for muscle ache.   Skin:  Negative for rash.   Allergic/Immunologic: Negative for asthma.   Neurological:  Negative for dizziness and headaches.      Objective:     Physical Exam   Constitutional: She is oriented to person, place, and time. She appears well-developed.      Comments:Appears uncomfortable.     HENT:   Head: Normocephalic and atraumatic.   Ears:   Right Ear: External ear normal.   Left Ear: External ear normal.   Nose: Nose normal.   Mouth/Throat: Oropharynx is clear and moist. Mucous membranes are moist. Oropharynx is clear.   Eyes: Conjunctivae, EOM and lids are normal. Pupils are equal, round, and reactive to light.   Neck: Trachea normal and phonation normal. Neck supple.   Cardiovascular: Normal rate, regular rhythm, normal heart sounds and normal pulses.   Pulmonary/Chest: Effort normal and breath sounds normal. No respiratory distress. She exhibits no tenderness.   Musculoskeletal: Normal range of motion.         General: Normal range of motion.      Right lower leg: No edema.      Left lower leg: No edema.      Comments: Negative Sergio's sign.   Neurological: no focal deficit. She is alert and oriented to person, place, and time.   Skin: Skin is warm, dry and intact. Capillary refill takes less than 2 seconds.   Psychiatric: Her speech is normal and behavior is normal. Judgment and thought content normal.   Nursing note and vitals reviewed.    XR CHEST PA AND LATERAL    Result Date: 11/3/2024  EXAMINATION: XR CHEST PA AND LATERAL CLINICAL HISTORY: Chest pain, unspecified TECHNIQUE: PA and lateral views of the chest were performed. COMPARISON: CT soft tissue neck 02/04/2022, CT renal stone study 05/02/2011 FINDINGS: Patient is slightly rotated.  Trachea is midline and patent.Scattered linear opacities at the lateral right upper lung zone consistent with platelike scarring versus atelectasis similar to previous cross-sectional imaging.  The  lungs are otherwise symmetrically well expanded without consolidation, pleural effusion or pneumothorax. The cardiac silhouette is normal in size. The hilar and mediastinal contours are unremarkable. Osseous structures show age-related mild degenerative change without acute or destructive process seen.     No radiographic acute intrathoracic process seen. Electronically signed by: Venkat Ferrell MD Date:    11/03/2024 Time:    14:55     EKG: HR 73, NSR, no STEMI.    Assessment:     1. Chest pain, unspecified type    2. Shortness of breath      Plan:       Chest pain, unspecified type  -     IN OFFICE EKG 12-LEAD (to [place])  -     XR CHEST PA AND LATERAL; Future; Expected date: 11/03/2024  -     SARS Coronavirus 2 Antigen, POCT Manual Read  -     Refer to Emergency Dept.    Shortness of breath  -     Refer to Emergency Dept.      53-year-old female with history of hypertension here for SOB, dyspnea on exertion, palpitations, and chest pain that started 2 days ago.  Chest pain feels like a dull ache to the left side of her chest.  It is worse with exertion.  Dyspnea and chest pain improve with rest.  Today she felt chest pain radiate to her left arm.  She felt so out of breath this morning that it was difficult for her to get dressed.  No prior history of VTE or ACS.  No family history of cardiac disease.  No history of asthma, COPD, or smoking.  No recent surgery or history of malignancy.  Denies fever, chills, dizziness, nausea or vomiting, leg swelling or pain, coughing, hemoptysis.  Hypertensive to 167/92.  Other vital signs are within normal limits.  SpO2 95%, HR 84.  Afebrile.  On exam, patient does appear uncomfortable.  No respiratory distress.  Lungs CTAB.  RRR.  No chest tenderness.  Neurologically intact without focal deficits.  Most concerning for pulmonary embolism vs ACS.  Differential also includes pneumonia, pneumothorax, myocarditis, pericarditis, pneumonia, dysrhythmia, pleurisy, costochondritis, GERD.  CXR with no acute findings. EKG shows HR 73, NSR. However she has a concerning story and I believe she needs further evaluation in the emergency room.  Spoke with physician rashida who also agreed with ED evaluation.  Spoke with Tessy who were also in agreement.  Patient was initially going to go to [facility] ED however now wants to go to [facility] ED vis POV.  Report made to [facility].       Patient Instructions   GO TO THE EMERGENCY ROOM IMMEDIATELY

## 2024-11-04 LAB
OHS QRS DURATION: 80 MS
OHS QTC CALCULATION: 449 MS

## 2024-11-05 RX ORDER — PREDNISONE 10 MG/1
10 TABLET ORAL
Qty: 10 TABLET | Refills: 0 | Status: SHIPPED | OUTPATIENT
Start: 2024-11-05

## 2024-11-05 RX ORDER — MELOXICAM 15 MG/1
15 TABLET ORAL
Qty: 30 TABLET | Refills: 0 | Status: SHIPPED | OUTPATIENT
Start: 2024-11-05

## 2024-12-06 RX ORDER — MELOXICAM 15 MG/1
15 TABLET ORAL
Qty: 30 TABLET | Refills: 0 | Status: SHIPPED | OUTPATIENT
Start: 2024-12-06

## 2024-12-06 RX ORDER — PREDNISONE 10 MG/1
10 TABLET ORAL
Qty: 10 TABLET | Refills: 0 | Status: SHIPPED | OUTPATIENT
Start: 2024-12-06

## 2024-12-31 ENCOUNTER — TELEPHONE (OUTPATIENT)
Dept: FAMILY MEDICINE | Facility: CLINIC | Age: 54
End: 2024-12-31
Payer: COMMERCIAL

## 2024-12-31 NOTE — TELEPHONE ENCOUNTER
Spoke with the patient regarding an appointment for 24 Jan 2025 at 1120 with . Patient is confirming the appointment. She was checking if there was an earlier appointment. Notified that this was  next available.

## 2025-01-08 RX ORDER — PREDNISONE 10 MG/1
10 TABLET ORAL
Qty: 10 TABLET | Refills: 0 | Status: SHIPPED | OUTPATIENT
Start: 2025-01-08

## 2025-01-08 RX ORDER — MELOXICAM 15 MG/1
15 TABLET ORAL
Qty: 30 TABLET | Refills: 0 | Status: SHIPPED | OUTPATIENT
Start: 2025-01-08

## 2025-02-03 RX ORDER — MELOXICAM 15 MG/1
15 TABLET ORAL
Qty: 30 TABLET | Refills: 0 | Status: SHIPPED | OUTPATIENT
Start: 2025-02-03

## 2025-02-03 RX ORDER — PREDNISONE 10 MG/1
10 TABLET ORAL
Qty: 10 TABLET | Refills: 0 | Status: SHIPPED | OUTPATIENT
Start: 2025-02-03

## 2025-03-18 RX ORDER — MELOXICAM 15 MG/1
15 TABLET ORAL
Qty: 30 TABLET | Refills: 0 | Status: SHIPPED | OUTPATIENT
Start: 2025-03-18